# Patient Record
Sex: MALE | Race: WHITE | NOT HISPANIC OR LATINO | Employment: FULL TIME | URBAN - METROPOLITAN AREA
[De-identification: names, ages, dates, MRNs, and addresses within clinical notes are randomized per-mention and may not be internally consistent; named-entity substitution may affect disease eponyms.]

---

## 2017-04-18 ENCOUNTER — GENERIC CONVERSION - ENCOUNTER (OUTPATIENT)
Dept: OTHER | Facility: OTHER | Age: 52
End: 2017-04-18

## 2017-04-19 ENCOUNTER — GENERIC CONVERSION - ENCOUNTER (OUTPATIENT)
Dept: OTHER | Facility: OTHER | Age: 52
End: 2017-04-19

## 2017-07-11 ENCOUNTER — ALLSCRIPTS OFFICE VISIT (OUTPATIENT)
Dept: OTHER | Facility: OTHER | Age: 52
End: 2017-07-11

## 2017-07-11 DIAGNOSIS — E78.00 PURE HYPERCHOLESTEROLEMIA: ICD-10-CM

## 2017-10-19 ENCOUNTER — GENERIC CONVERSION - ENCOUNTER (OUTPATIENT)
Dept: OTHER | Facility: OTHER | Age: 52
End: 2017-10-19

## 2017-10-30 ENCOUNTER — GENERIC CONVERSION - ENCOUNTER (OUTPATIENT)
Dept: OTHER | Facility: OTHER | Age: 52
End: 2017-10-30

## 2018-01-15 NOTE — PROGRESS NOTES
Assessment    1  Never a smoker   2  Encounter for preventive health examination (V70 0) (Z00 00)   3  Hypercholesterolemia (272 0) (E78 00)   · 6 222/52/141   4  Onychomycosis (110 1) (B35 1)   5  Atypical nevi (216 9) (D22 9)    Plan  Encounter for screening colonoscopy    · *1 -  [facility]  *  Status: Active -  Retrospective By Protocol Authorization  Requested for: [address] Summary provided  : Yes  Hypercholesterolemia    · (1) COMPREHENSIVE METABOLIC PANEL; Status:Active; Requested for:[id number];    · (1) LIPID PANEL, FASTING; Status:Active; Requested for:[id number];   Onychomycosis    · Ciclopirox 8 % External Solution; Apply to nail q hs    Discussion/Summary  Impression: health maintenance visit  Currently, he eats a healthy diet  Prostate cancer screening: PSA is not indicated  Colorectal cancer screening: colonoscopy has been ordered  Ed is here for health maintenance visit and discussion of a few chronic issues  He generally has a very healthy diet and exercises one or 2 hours daily because he competes in triathlons  He was given a lab slip for fasting lipid panel and comprehensive metabolic profile  His father  at 46 of an MI, so he has a strong family history of heart disease  He takes no medications currently, but his lipids were elevated a few years ago, and he is not adverse to taking medication if needed  He has fungal toenail on the left side but only the fifth toenail  He was given a prescription for Penlac nail lacquer with instructions  Original blood pressure was quite elevated, but he had received some stressful news just prior to his visit  When I retook it it was top normal range  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  He has several large an atypical appearing nevi on the back  He will continue with yearly dermatitis visits to monitor these  We will plan yearly health maintenance visits        Chief Complaint  annual History of Present Illness  , Adult Male: The patient is being seen for a health maintenance evaluation  The last health maintenance visit was 3 year(s) ago  General Health: The patient's health since the last visit is described as good  He has regular dental visits  He denies vision problems  He has hearing loss  Immunizations status:  notes some buzzing in ears over past year  Lifestyle:  He consumes a diverse and healthy diet  He does not have any weight concerns  He exercises regularly  He does not use tobacco  He consumes alcohol  He denies drug use  Screening:   HPI: Here for  and follow up  Works as  so he gets yearly EKG and vision  Diet is good works out daily  Had testicular pain in 2015, saw urology and it turned out it was related to his bike seat  BP is elevated because he had stressful phone call before it was taken           Review of Systems    Constitutional: No fever or chills, feels well, no tiredness, no recent weight gain or weight loss  ENT: no complaints of earache, no hearing loss, no nosebleeds, no nasal discharge, no sore throat, no hoarseness  Cardiovascular: No complaints of slow heart rate, no fast heart rate, no chest pain, no palpitations, no leg claudication, no lower extremity  Respiratory: No complaints of shortness of breath, no wheezing, no cough, no SOB on exertion, no orthopnea or PND  Gastrointestinal: No complaints of abdominal pain, no constipation, no nausea or vomiting, no diarrhea or bloody stools  Genitourinary: as noted in HPI  Musculoskeletal: No complaints of arthralgia, no myalgias, no joint swelling or stiffness, no limb pain or swelling  Neurological: No compliants of headache, no confusion, no convulsions, no numbness or tingling, no dizziness or fainting, no limb weakness, no difficulty walking  Psychiatric: Is not suicidal, no sleep disturbances, no anxiety or depression, no change in personality, no emotional problems  Endocrine: No complaints of proptosis, no hot flashes, no muscle weakness, no erectile dysfunction, no deepening of the voice, no feelings of weakness  Active Problems    1  Family history of coronary artery disease (V17 3) (Z82 49)   2  Fatigue (780 79) (R53 83)   3  Health care maintenance (V70 0) (Z00 00)   4  Hypercholesterolemia (272 0) (E78 00)   5  Microscopic hematuria (599 72) (R31 29)   6  Need for influenza vaccination (V04 81) (Z23)    Past Medical History    · History of acute sinusitis (V12 69) (Z87 09)   · History of urethritis (V13 09) (A95 349)    Surgical History    · History of Hip Surgery    Family History  Father    · Family history of coronary artery disease (V17 3) (Z80 55)    Social History    · Exercises regularly   · Full-time employment   · Fulltime  [employer]   · Never a smoker   · No tobacco/smoke exposure   · Social alcohol use (Z78 9)    Current Meds   1  No Reported Medications Recorded    Allergies    1  No Known Drug Allergies    Vitals   Recorded: [id number] 12:18PM Recorded: 98LYG2882 87:88MM   Systolic 948 860   Diastolic 86 98   Height  5 ft 9 3 in   Weight  172 lb 2 oz   BMI Calculated  25 2   BSA Calculated  1 94     Physical Exam    Constitutional   General appearance: No acute distress, well appearing and well nourished  Head and Face   Palpation of the face and sinuses: No sinus tenderness  Ears, Nose, Mouth, and Throat   External inspection of ears and nose: Normal     Otoscopic examination: Tympanic membranes translucent with normal light reflex  Canals patent without erythema  Hearing: Normal     Nasal mucosa, septum, and turbinates: Normal without edema or erythema  Lips, teeth, and gums: Normal, good dentition  Oropharynx: Normal with no erythema, edema, exudate or lesions  Neck   Neck: Supple, symmetric, trachea midline, no masses  Thyroid: Normal, no thyromegaly      Pulmonary   Respiratory effort: No increased work of breathing or signs of respiratory distress  Auscultation of lungs: Clear to auscultation  Cardiovascular   Auscultation of heart: Normal rate and rhythm, normal S1 and S2, no murmurs  Carotid pulses: 2+ bilaterally  Pedal pulses: 2+ bilaterally  Examination of extremities for edema and/or varicosities: Normal     Abdomen   Abdomen: Non-tender, no masses  Liver and spleen: No hepatomegaly or splenomegaly  Lymphatic   Palpation of lymph nodes in neck: No lymphadenopathy  Musculoskeletal   Gait and station: Normal     Inspection/palpation of digits and nails: Abnormal   (left foot with thickening and white discoloration 5th nail  Several large nevi on back)   Neurologic   Cranial nerves: Cranial nerves 2-12 intact  Reflexes: 2+ and symmetric  Results/Data  PHQ-2 Adult Depression Screening [id number] 11:26AM User, s     Test Name Result Flag Reference   PHQ-2 Adult Depression Score 0     Over the last two weeks, how often have you been bothered by any of the following problems?   Little interest or pleasure in doing things: Not at all - 0  Feeling down, depressed, or hopeless: Not at all - 0   PHQ-2 Adult Depression Screening Negative         Signatures   Electronically signed by : Sil Hoskins MD; Jul 11 2017 12:46PM EST                       (Author)

## 2018-01-17 NOTE — RESULT NOTES
Verified Results  CT CORONARY CALCIUM SCORE [id number] 10:09AM Montserrat Scott Order Number: [id number]    - Patient Instructions: To schedule this appointment, please contact Central Scheduling at [phone]  Test Name Result Flag Reference   CT CORONARY CALCIUM SCORE (Report)     CT CORONARY ARTERY CALCIUM SCREENING WITHOUT INTRAVENOUS CONTRAST     INDICATION: Strong family history of coronary artery disease  Assess for calcific coronary plaque  TECHNIQUE: Low radiation dose computed tomography of the heart was performed with EKG gating, suspended respiration, and without intravenous contrast  This examination, like all CT scans performed in the [facility], was performed    utilizing techniques to minimize radiation dose exposure, including the use of iterative reconstruction and automated exposure control  Postprocessing was performed on a 3-D computer workstation to measure the amount of calcium in the coronary arteries  Imaging was limited to the heart and the immediately adjacent lungs  FINDINGS:      Coronary artery calcium score breakdown is as follows (note: calcified atherosclerotic plaque in the posterior descending artery is included in right coronary artery score for right dominant circulation and left circumflex score for left dominant    circulation):     Left main coronary artery: 0   Left anterior descending coronary artery and diagonal branches: 36   Left circumflex coronary artery and left marginal branches: 119   Right coronary artery and right marginal branches: 48     TOTAL coronary calcium score: 203   Calcium score PERCENTILE of age, race, and gender matched database participants in the Multi-Ethnic Study of Atherosclerosis (ZAVALA) trial:  95th       No significant abnormality is identified in the heart or visualized surrounding tissues  IMPRESSION:     Total cardiac score equals 209   For more useful information regarding the prognostic significance of the calcium score, please consult the calculator at the website [email or web address] [email or web address][employer]martin [email or web address]  aspx          Workstation performed: [id number]     Signed by:   Juanita Mike MD   11/25/16

## 2018-01-22 VITALS
HEART RATE: 48 BPM | HEIGHT: 70 IN | BODY MASS INDEX: 25.48 KG/M2 | SYSTOLIC BLOOD PRESSURE: 124 MMHG | WEIGHT: 178 LBS | DIASTOLIC BLOOD PRESSURE: 70 MMHG

## 2018-01-22 VITALS
HEIGHT: 69 IN | WEIGHT: 172.13 LBS | DIASTOLIC BLOOD PRESSURE: 86 MMHG | BODY MASS INDEX: 25.5 KG/M2 | SYSTOLIC BLOOD PRESSURE: 138 MMHG

## 2018-01-22 VITALS
SYSTOLIC BLOOD PRESSURE: 118 MMHG | WEIGHT: 178 LBS | HEART RATE: 53 BPM | DIASTOLIC BLOOD PRESSURE: 80 MMHG | HEIGHT: 70 IN | BODY MASS INDEX: 25.48 KG/M2

## 2018-02-09 ENCOUNTER — OFFICE VISIT (OUTPATIENT)
Dept: FAMILY MEDICINE CLINIC | Facility: CLINIC | Age: 53
End: 2018-02-09
Payer: COMMERCIAL

## 2018-02-09 VITALS
HEART RATE: 68 BPM | DIASTOLIC BLOOD PRESSURE: 80 MMHG | HEIGHT: 70 IN | RESPIRATION RATE: 16 BRPM | SYSTOLIC BLOOD PRESSURE: 110 MMHG | BODY MASS INDEX: 25.34 KG/M2 | WEIGHT: 177 LBS | OXYGEN SATURATION: 96 %

## 2018-02-09 DIAGNOSIS — Z12.5 PROSTATE CANCER SCREENING: ICD-10-CM

## 2018-02-09 DIAGNOSIS — E55.9 VITAMIN D DEFICIENCY: ICD-10-CM

## 2018-02-09 DIAGNOSIS — Z00.00 WELL ADULT HEALTH CHECK: Primary | ICD-10-CM

## 2018-02-09 DIAGNOSIS — E78.00 HYPERCHOLESTEROLEMIA: ICD-10-CM

## 2018-02-09 DIAGNOSIS — I25.10 CORONARY ATHEROSCLEROSIS DUE TO CALCIFIED CORONARY LESION: ICD-10-CM

## 2018-02-09 DIAGNOSIS — I25.84 CORONARY ATHEROSCLEROSIS DUE TO CALCIFIED CORONARY LESION: ICD-10-CM

## 2018-02-09 PROBLEM — D22.9 ATYPICAL NEVI: Status: ACTIVE | Noted: 2017-07-11

## 2018-02-09 PROCEDURE — 99396 PREV VISIT EST AGE 40-64: CPT | Performed by: FAMILY MEDICINE

## 2018-02-09 RX ORDER — ASPIRIN 81 MG/1
81 TABLET, CHEWABLE ORAL DAILY
COMMUNITY
Start: 2018-01-24 | End: 2021-01-06

## 2018-02-09 RX ORDER — ATORVASTATIN CALCIUM 10 MG/1
10 TABLET, FILM COATED ORAL
COMMUNITY
Start: 2018-01-24 | End: 2018-06-01 | Stop reason: SDUPTHER

## 2018-02-09 NOTE — ASSESSMENT & PLAN NOTE
I wouldcertainly agree with atorvastatin as well as a baby aspirin at this point   He will be having labs done in the near future to monitor the Lipitor

## 2018-02-09 NOTE — PROGRESS NOTES
FAMILY PRACTICE OFFICE VISIT       NAME: Mireya Myrick  AGE: 48 y o  SEX: male       : 1965        MRN: [id number]    DATE: 2018  TIME: 2:28 PM    Assessment and Plan     Problem List Items Addressed This Visit     Vitamin D deficiency      Recheck vitamin-D with labs ordered by Cardiology  Hypercholesterolemia    Relevant Medications    atorvastatin (LIPITOR) 10 mg tablet    Other Relevant Orders    CBC    Coronary atherosclerosis       I wouldcertainly agree with atorvastatin as well as a baby aspirin at this point  He will be having labs done in the near future to monitor the Lipitor         Relevant Orders    Comprehensive metabolic panel    Lipid Panel with Direct LDL reflex    CBC      Other Visit Diagnoses     Well adult health check    -  Primary    Prostate cancer screening        Relevant Orders    PSA          Coronary atherosclerosis    I wouldcertainly agree with atorvastatin as well as a baby aspirin at this point  He will be having labs done in the near future to monitor the Lipitor    Vitamin D deficiency   Recheck vitamin-D with labs ordered by Cardiology  There are no Patient Instructions on file for this visit  Chief Complaint     Chief Complaint   Patient presents with    Follow-up     6Month       History of Present Illness   Mireya Myrick is a 48y o -year-old male who   Presents today for an annual physical   Overall, he is in excellent health  He exercises routinely with triathlons and has no cardiovascular limitations  He does have a high coronary calcium score and a significant family history of cardiac disease as his father  suddenly at the age of 46  He has been experiencing no chest pain, shortness of breath, palpitations or lightheadedness  He has elevated cholesterol and recently agreed to start atorvastatin as prescribed by his cardiologist   He is yet to start the aspirin      Review of Systems   Review of Systems   Constitutional: Negative for appetite change, chills, fatigue, fever and unexpected weight change  HENT: Negative for ear pain and trouble swallowing  Eyes: Negative for visual disturbance  Respiratory: Negative for chest tightness, shortness of breath and wheezing  Cardiovascular: Negative for chest pain  Gastrointestinal: Negative for abdominal distention, abdominal pain, blood in stool, constipation and diarrhea  Endocrine: Negative for polyuria  Genitourinary: Negative for difficulty urinating and flank pain  Musculoskeletal: Negative for arthralgias and myalgias  Skin: Negative for rash  Neurological: Negative for dizziness and light-headedness  Hematological: Negative for adenopathy  Does not bruise/bleed easily  Psychiatric/Behavioral: Negative for sleep disturbance  Active Problem List     Patient Active Problem List   Diagnosis    Vitamin D deficiency    Hypercholesterolemia    Coronary atherosclerosis    Atypical nevi    Microscopic hematuria         Past Medical History:  No past medical history on file  Past Surgical History:  Past Surgical History:   Procedure Laterality Date    HIP SURGERY      Trochanteric fracture requiring nail       Family History:  Family History   Problem Relation Age of Onset    Cancer Mother     Hypertension Mother     Coronary artery disease Father     Hyperlipidemia Father     Hypertension Brother     Colon cancer Neg Hx        Social History:  Social History     Social History    Marital status: /Civil Union     Spouse name: N/A    Number of children: N/A    Years of education: N/A     Occupational History    Not on file       Social History Main Topics    Smoking status: Never Smoker    Smokeless tobacco: Never Used    Alcohol use Yes    Drug use: Unknown    Sexual activity: Not on file     Other Topics Concern    Not on file     Social History Narrative    No narrative on file       Objective     Vitals:    02/09/18 1337 BP: 110/80   Pulse: 68   Resp: 16   SpO2: 96%     Wt Readings from Last 3 Encounters:   02/09/18 80 3 kg (177 lb)   10/19/17 80 7 kg (178 lb)   07/11/17 78 1 kg (172 lb 2 1 oz)       Physical Exam   Constitutional: He is oriented to person, place, and time  He appears well-developed and well-nourished  No distress  HENT:   Head: Normocephalic  Eyes: Pupils are equal, round, and reactive to light  Neck: No tracheal deviation present  No thyromegaly present  Cardiovascular: Normal rate, regular rhythm and normal heart sounds  No murmur heard  Pulmonary/Chest: Effort normal  No respiratory distress  He has no wheezes  He has no rales  Abdominal: Soft  He exhibits no distension  There is no tenderness  Musculoskeletal: Normal range of motion  Neurological: He is alert and oriented to person, place, and time  No cranial nerve deficit  Skin: Skin is warm  He is not diaphoretic  Psychiatric: He has a normal mood and affect  Judgment and thought content normal        Pertinent Laboratory/Diagnostic Studies:  No results found for: GLUCOSE, BUN, CREATININE, CALCIUM, NA, K, CO2, CL  No results found for: ALT, AST, GGT, ALKPHOS, BILITOT    No results found for: WBC, HGB, HCT, MCV, PLT    No results found for: TSH    No results found for: CHOL  No results found for: TRIG  No results found for: HDL  No results found for: LDLCALC  No results found for: HGBA1C    No results found for this or any previous visit  Orders Placed This Encounter   Procedures    PSA    Comprehensive metabolic panel    Lipid Panel with Direct LDL reflex    CBC       ALLERGIES:  No Known Allergies    Current Medications     Current Outpatient Prescriptions   Medication Sig Dispense Refill    aspirin 81 mg chewable tablet Chew 81 mg      atorvastatin (LIPITOR) 10 mg tablet Take 10 mg by mouth       No current facility-administered medications for this visit            Health Maintenance   There are no preventive care reminders to display for this patient    Immunization History   Administered Date(s) Administered    Anthrax 08/08/1999, 10/02/1999, 10/03/1999, 03/04/2000, 12/14/2002, 01/27/2004    Hep B, adult 01/27/2004, 11/02/2005    Influenza 11/05/2012, 10/01/2017    Influenza Quadrivalent Preservative Free 3 years and older IM 08/31/2015    Influenza Quadrivalent Preservative Free Pediatric IM 10/01/2016    Influenza Quadrivalent, 6-35 Months IM 11/15/2015    Influenza TIV (IM) 11/05/2012    MMR 12/01/2001    Meningococcal, Unknown Serogroups 01/27/2004    Td (adult), adsorbed 09/10/1990, 08/01/1991, 05/05/2002    Tdap 05/29/2014    Tuberculin Skin Test-PPD Intradermal 04/02/1986, 10/03/1989, 08/23/1990, 01/11/1993, 07/06/1998, 12/04/1999, 01/10/2003, 01/27/2004    Typhoid, ViCPs 04/28/1988, 09/10/1990, 11/08/1993, 06/30/1998, 09/01/2002, 11/02/2005    Yellow Fever 07/10/1998       Joey Angulo MD

## 2018-03-09 ENCOUNTER — OFFICE VISIT (OUTPATIENT)
Dept: URGENT CARE | Facility: MEDICAL CENTER | Age: 53
End: 2018-03-09
Payer: COMMERCIAL

## 2018-03-09 VITALS
BODY MASS INDEX: 24.34 KG/M2 | HEIGHT: 70 IN | RESPIRATION RATE: 18 BRPM | DIASTOLIC BLOOD PRESSURE: 86 MMHG | OXYGEN SATURATION: 97 % | WEIGHT: 170 LBS | SYSTOLIC BLOOD PRESSURE: 134 MMHG | TEMPERATURE: 98.9 F | HEART RATE: 58 BPM

## 2018-03-09 DIAGNOSIS — H69.81 ETD (EUSTACHIAN TUBE DYSFUNCTION), RIGHT: ICD-10-CM

## 2018-03-09 DIAGNOSIS — H60.311 ACUTE DIFFUSE OTITIS EXTERNA OF RIGHT EAR: Primary | ICD-10-CM

## 2018-03-09 PROCEDURE — G0382 LEV 3 HOSP TYPE B ED VISIT: HCPCS | Performed by: FAMILY MEDICINE

## 2018-03-09 RX ORDER — NEOMYCIN SULFATE, POLYMYXIN B SULFATE AND DEXAMETHASONE 3.5; 10000; 1 MG/ML; [USP'U]/ML; MG/ML
SUSPENSION/ DROPS OPHTHALMIC
Qty: 5 ML | Refills: 0 | Status: SHIPPED | OUTPATIENT
Start: 2018-03-09 | End: 2018-08-07

## 2018-03-09 NOTE — PATIENT INSTRUCTIONS
There is some suspicion for mild otitis media of the right side and I will treat patient with a course of antibiotic ear drops  Some of the symptoms are consistent with eustachian tube dysfunction  May continue Valsalva maneuver and decongestants when u do not anticipate flying  Follow up with ENT in 1 week if no significant improvement in symptoms

## 2018-03-11 NOTE — PROGRESS NOTES
[facility]        NAME: Osiel Morocho is a 48 y o  male  : 1965    MRN: [id number]  DATE: 2018  TIME: 12:56 PM    Assessment and Plan   Acute diffuse otitis externa of right ear [H60 311]  1  Acute diffuse otitis externa of right ear  neomycin-polymyxin-dexamethasone (MAXITROL) ophthalmic suspension   2  ETD (Eustachian tube dysfunction), right  neomycin-polymyxin-dexamethasone (MAXITROL) ophthalmic suspension         Patient Instructions       Follow up with PCP in 3-5 days  Proceed to  ER if symptoms worsen  Chief Complaint     Chief Complaint   Patient presents with    Earache     Patient c/o R side ear pain x 1 week          History of Present Illness       Earache    There is pain in the right ear  This is a new problem  The current episode started in the past 7 days  The problem has been unchanged  There has been no fever  The pain is mild  Pertinent negatives include no abdominal pain, coughing, diarrhea, ear discharge, headaches, hearing loss, neck pain, rash, rhinorrhea, sore throat or vomiting  He has tried nothing for the symptoms  There is no history of a chronic ear infection or hearing loss  Review of Systems   Review of Systems   HENT: Positive for ear pain  Negative for ear discharge, hearing loss, rhinorrhea and sore throat  Respiratory: Negative for cough  Gastrointestinal: Negative for abdominal pain, diarrhea and vomiting  Musculoskeletal: Negative for neck pain  Skin: Negative for rash  Neurological: Negative for headaches           Current Medications       Current Outpatient Prescriptions:     aspirin 81 mg chewable tablet, Chew 81 mg, Disp: , Rfl:     atorvastatin (LIPITOR) 10 mg tablet, Take 10 mg by mouth, Disp: , Rfl:     neomycin-polymyxin-dexamethasone (MAXITROL) ophthalmic suspension, 3 drops in R ear three times a day for 7 days, Disp: 5 mL, Rfl: 0    Current Allergies     Allergies as of 2018    (No Known Allergies) The following portions of the patient's history were reviewed and updated as appropriate: allergies, current medications, past family history, past medical history, past social history, past surgical history and problem list      No past medical history on file  Past Surgical History:   Procedure Laterality Date    HIP SURGERY      Trochanteric fracture requiring nail       Family History   Problem Relation Age of Onset    Cancer Mother     Hypertension Mother     Coronary artery disease Father     Hyperlipidemia Father     Hypertension Brother     Colon cancer Neg Hx          Medications have been verified  Objective   /86   Pulse 58   Temp 98 9 °F (37 2 °C)   Resp 18   Ht 5' 10" (1 778 m)   Wt 77 1 kg (170 lb)   SpO2 97%   BMI 24 39 kg/m²        Physical Exam     Physical Exam   Constitutional: He appears well-developed and well-nourished  No distress  HENT:   Head: Normocephalic  Right Ear: External ear and ear canal normal  Tympanic membrane is not erythematous and not bulging  Left Ear: External ear and ear canal normal  Tympanic membrane is not erythematous and not bulging  Nose: Mucosal edema and rhinorrhea present  Mouth/Throat: Oropharynx is clear and moist and mucous membranes are normal  No uvula swelling  No oropharyngeal exudate, posterior oropharyngeal edema, posterior oropharyngeal erythema or tonsillar abscesses  Eyes: Conjunctivae and EOM are normal  Right eye exhibits no discharge  Left eye exhibits no discharge  Neck: Neck supple  Cardiovascular: Normal rate, regular rhythm and normal heart sounds  Pulmonary/Chest: Effort normal and breath sounds normal  No respiratory distress  He has no wheezes  He has no rales  Skin: He is not diaphoretic  Nursing note and vitals reviewed

## 2018-04-18 ENCOUNTER — OFFICE VISIT (OUTPATIENT)
Dept: FAMILY MEDICINE CLINIC | Facility: CLINIC | Age: 53
End: 2018-04-18

## 2018-04-18 VITALS
HEIGHT: 70 IN | DIASTOLIC BLOOD PRESSURE: 84 MMHG | SYSTOLIC BLOOD PRESSURE: 120 MMHG | RESPIRATION RATE: 12 BRPM | HEART RATE: 64 BPM | BODY MASS INDEX: 25.2 KG/M2 | WEIGHT: 176 LBS

## 2018-04-18 DIAGNOSIS — Z02.89 ENCOUNTER FOR FEDERAL AVIATION ADMINISTRATION (FAA) EXAMINATION: Primary | ICD-10-CM

## 2018-04-18 PROCEDURE — 99499 UNLISTED E&M SERVICE: CPT | Performed by: FAMILY MEDICINE

## 2018-04-18 NOTE — PROGRESS NOTES
Chief Complaint   Patient presents with    Physical Exam     [facility] PE with EKG with Readers Conf Number: [id number]

## 2018-05-31 RX ORDER — LISINOPRIL 10 MG/1
10 TABLET ORAL
COMMUNITY
Start: 2018-05-26 | End: 2018-07-10 | Stop reason: SDUPTHER

## 2018-06-01 ENCOUNTER — OFFICE VISIT (OUTPATIENT)
Dept: CARDIOLOGY CLINIC | Facility: CLINIC | Age: 53
End: 2018-06-01
Payer: COMMERCIAL

## 2018-06-01 VITALS
HEIGHT: 70 IN | SYSTOLIC BLOOD PRESSURE: 88 MMHG | HEART RATE: 57 BPM | DIASTOLIC BLOOD PRESSURE: 60 MMHG | BODY MASS INDEX: 23.48 KG/M2 | WEIGHT: 164 LBS

## 2018-06-01 DIAGNOSIS — I10 BENIGN ESSENTIAL HTN: ICD-10-CM

## 2018-06-01 DIAGNOSIS — E78.5 DYSLIPIDEMIA: ICD-10-CM

## 2018-06-01 DIAGNOSIS — I25.10 CORONARY ARTERY DISEASE INVOLVING NATIVE HEART, ANGINA PRESENCE UNSPECIFIED, UNSPECIFIED VESSEL OR LESION TYPE: Primary | ICD-10-CM

## 2018-06-01 DIAGNOSIS — I25.2 MYOCARDIAL INFARCT, OLD: ICD-10-CM

## 2018-06-01 DIAGNOSIS — I25.10 CORONARY ARTERY DISEASE INVOLVING NATIVE CORONARY ARTERY OF NATIVE HEART WITHOUT ANGINA PECTORIS: ICD-10-CM

## 2018-06-01 PROCEDURE — 93000 ELECTROCARDIOGRAM COMPLETE: CPT | Performed by: INTERNAL MEDICINE

## 2018-06-01 PROCEDURE — 99204 OFFICE O/P NEW MOD 45 MIN: CPT | Performed by: INTERNAL MEDICINE

## 2018-06-01 RX ORDER — ATORVASTATIN CALCIUM 80 MG/1
80 TABLET, FILM COATED ORAL DAILY
Qty: 90 TABLET | Refills: 5
Start: 2018-06-01 | End: 2018-08-22 | Stop reason: SDUPTHER

## 2018-06-01 RX ORDER — METOPROLOL SUCCINATE 25 MG/1
25 TABLET, EXTENDED RELEASE ORAL DAILY
Qty: 30 TABLET | Refills: 5 | Status: SHIPPED | OUTPATIENT
Start: 2018-06-01 | End: 2018-08-22 | Stop reason: SDUPTHER

## 2018-06-01 NOTE — PATIENT INSTRUCTIONS
F/U in 6 weeks after repeat lipid panel  Echo with next visit  Call if SBP < 100 or if persistent lightheadedness--will lower lisinopril if occurs  Cardiac rehab

## 2018-06-01 NOTE — PROGRESS NOTES
Cardiology Consultation        Lis Doherty      1965      [id number]    Reasonfor consultation:  CAD, prior cardiac arrest        Discussion/Summary:    1  Anterior wall ST-elevation myocardial infarction 4/76/1559, complicated by VF arrest, status post defibrillation x3, therapeutic hypothermia  2  CAD with residual 80% left circumflex disease  3  Dyslipidemia, high suspicion of heterozygous familial hypercholesterolemia (LDL in 2015 up to 182 mg/dL in the setting of regular exercise and extremely healthy lifestyle, father with myocardial infarction at age 46)    · From a symptomatic standpoint this patient is doing very well  Will continue dual anti-platelet treatment for at least 1 year, then reassess risks/benefit profile  Continue lisinopril  Will change metoprolol to 25 mg once daily, succinate formulation as patient does have lightheadedness and low blood pressure on today's visit  Encouraged adequate hydration  · Echocardiogram with next visit in 6 weeks  · Repeat lipid panel before next visit in 6 weeks  Goal LDL should be well below 70, non-HDL well below 100  If goal not met, may consider changing to rosuvastatin 40 mg daily and adding ezetimibe  · Recommended screening lipid panel of patient's children   · Will refer to cardiac rehabilitation  · PCI of left circumflex scheduled for next week at [place]  · 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  · ECG with nonspecific ST and T-wave abnormality anteriorly, likely secondary to prior infarction  · Patient to call if any changes        Interval History: This is a very pleasant 29-year-old male with a history of dyslipidemia and family history of premature CAD with father having myocardial infarction at age 46  He is a very health conscious   On 05/20/2018 several hours into a triathlon, while running, he experienced a cardiac arrest which was witnessed  Fortunately, 80 was applied and he received 3 shocks and subsequent CPR  He presented to [facility] at which time ECG revealed anterior ST elevations  Emergent cardiac catheterization revealed 100% occlusion of proximal LAD and he thereafter underwent PCI/KASSANDRA with 3 5 x 23 mm stent to the proximal LAD  He had 10% mid RCA disease and 80% mid circumflex disease  He underwent therapeutic hypothermia with subsequent mild cognitive decline and memory loss  Echocardiogram 05/23/2018 revealed slight improvement in left ventricular ejection fraction at 50%  He was evaluated by Leavy Nissen, CRNP at [place] yesterday  From a symptomatic standpoint, he has substernal chest pain with sneezing or coughing, or palpation which has progressively been improving  His cognitive function is almost completely back to normal, although it he does have residual memory loss around the time of his cardiac arrest   He denies exertional chest discomfort, dyspnea, palpitations, lower extremity edema, any further episodes of presyncope or syncope, arthralgias or myalgias, abnormal bleeding or bruising while on dual anti-platelet treatment  He has been maintained on aspirin, Brilinta, high-dose atorvastatin, lisinopril 10 mg daily, metoprolol tartrate 25 mg twice daily  He does admit to occasional lightheadedness with standing up  His wife was a dietitian feels that his blood pressure is too low  He has not yet initiated cardiac rehabilitation  Review of prior blood work 12/01/2015 reveals very high LDL at 182 mg/dL, elevated triglycerides at 167 mg/dL, HDL of 48  Most recent lipid panel from 05/21/2018 revealed LDL 84 mg/dL, triglycerides 69 mg/dL      He is scheduled for PCI of left circumflex artery next week at [facility]:  Vitals:    06/01/18 1027   BP: (!) 88/60   BP Location: Right arm   Patient Position: Sitting   Cuff Size: Standard   Pulse: 57   Weight: 74 4 kg (164 lb)   Height: 5' 10" (1 778 m)         No past medical history on file  Social History     Social History    Marital status: /Civil Union     Spouse name: N/A    Number of children: N/A    Years of education: N/A     Occupational History    Not on file  Social History Main Topics    Smoking status: Never Smoker    Smokeless tobacco: Never Used    Alcohol use Yes    Drug use: Unknown    Sexual activity: Not on file     Other Topics Concern    Not on file     Social History Narrative    No narrative on file      Family History   Problem Relation Age of Onset    Cancer Mother     Hypertension Mother     Coronary artery disease Father     Hyperlipidemia Father     Hypertension Brother     Colon cancer Neg Hx      Past Surgical History:   Procedure Laterality Date    HIP SURGERY      Trochanteric fracture requiring nail       Current Outpatient Prescriptions:     aspirin 81 mg chewable tablet, Chew 81 mg, Disp: , Rfl:     atorvastatin (LIPITOR) 80 mg tablet, Take 1 tablet (80 mg total) by mouth daily, Disp: 90 tablet, Rfl: 5    lisinopril (ZESTRIL) 10 mg tablet, Take 10 mg by mouth, Disp: , Rfl:     ticagrelor (BRILINTA) 90 MG, Take 90 mg by mouth every 12 (twelve) hours, Disp: , Rfl:     metoprolol succinate (TOPROL-XL) 25 mg 24 hr tablet, Take 1 tablet (25 mg total) by mouth daily, Disp: 30 tablet, Rfl: 5    neomycin-polymyxin-dexamethasone (MAXITROL) ophthalmic suspension, 3 drops in R ear three times a day for 7 days, Disp: 5 mL, Rfl: 0        Review of Systems:  Review of Systems   Respiratory: Negative  Neurological: Positive for light-headedness  All other systems reviewed and are negative  Physical Exam:  Physical Exam   Constitutional: He is oriented to person, place, and time  He appears well-developed and well-nourished  No distress  HENT:   Head: Normocephalic and atraumatic  Eyes: EOM are normal  Pupils are equal, round, and reactive to light  Right eye exhibits no discharge  No scleral icterus  Neck: Normal range of motion  Neck supple  No thyromegaly present  Cardiovascular: Normal rate, regular rhythm and normal heart sounds  Exam reveals no gallop and no friction rub  No murmur heard  Pulmonary/Chest: [place]ort normal and breath sounds normal    Abdominal: He exhibits no distension  There is no tenderness  There is no rebound and no guarding  Musculoskeletal: Normal range of motion  He exhibits no edema  Neurological: He is alert and oriented to person, place, and time  Coordination normal    Skin: Skin is warm and dry  No rash noted  He is not diaphoretic  No erythema  No pallor  Psychiatric: He has a normal mood and affect   His behavior is normal  Judgment and thought content normal

## 2018-06-05 ENCOUNTER — CLINICAL SUPPORT (OUTPATIENT)
Dept: CARDIAC REHAB | Facility: HOSPITAL | Age: 53
End: 2018-06-05
Attending: INTERNAL MEDICINE
Payer: COMMERCIAL

## 2018-06-05 VITALS — HEIGHT: 70 IN | BODY MASS INDEX: 23.48 KG/M2 | WEIGHT: 164 LBS

## 2018-06-05 DIAGNOSIS — I25.2 MYOCARDIAL INFARCT, OLD: ICD-10-CM

## 2018-06-05 DIAGNOSIS — I25.10 CORONARY ARTERY DISEASE INVOLVING NATIVE HEART, ANGINA PRESENCE UNSPECIFIED, UNSPECIFIED VESSEL OR LESION TYPE: ICD-10-CM

## 2018-06-05 PROCEDURE — 93797 PHYS/QHP OP CAR RHAB WO ECG: CPT

## 2018-06-05 NOTE — PROGRESS NOTES
Exercise Prescription       Exercise Modality  Initial Workload MET Level    Nu-Step (NU) Level: n/a     Rower (RW)  Yeboah: 45-60 4 0 METs   Life Cycle Level: 4 5 0 METs   Arm Ergometer (AE) RPM: 25 Level 3 5 4 0 METs   Treadmill 3 4 mph 1 % grade 4 1 METs   Recumbent Bike (RB) Level: 4 Yeboah: 40 5 METs   Resistance (RES) 5-10 lbs Weights 40-60 lbs Pulleys Level Red Resistance Bands        Comments: Patient completed submax GXT reaching 7 5 METs  Gordon score suggests starting 3 3 METs  We will start exercise at 4 0-5 0 METs and increase intensities as tolerated by patient to reach goal of running and biking  -143

## 2018-06-05 NOTE — PROGRESS NOTES
CARDIAC/PULMONARY REHAB ASSESSMENT    Today's date: 2018   Patient name: Tonya Frausto      : 1965       MRN: [id number]  PCP: Angeles Whitaker MD  Cardiologist: Dr Partha Lynne    Dx:   Encounter Diagnoses   Name Primary?  Coronary artery disease involving native heart, angina presence unspecified, unspecified vessel or lesion type     Myocardial infarct, old      Date of onset: 2018  Cultural needs: n/a    Height: 5'10"  Weight: 164 lb   Medical History: No past medical history on file  Physical Limitations: None  Other: patient to have stent placed on 18 at [facility]-Dr Fidel Potts    Risk Factors   Cholesterol: yes  Smoking: no-never smoker  HTN: no  DM: no  Obesity: no  Inactivity: no-was running triath[employer] when event happened  Family History:  Premature CAD-Father MI 46 yrs  Family History   Problem Relation Age of Onset    Cancer Mother     Hypertension Mother     Coronary artery disease Father     Hyperlipidemia Father     Hypertension Brother     Colon cancer Neg Hx      Allergies: No Known Allergies  Other: Hip surgery-    Current Medications:   Current Outpatient Prescriptions   Medication Sig Dispense Refill    aspirin 81 mg chewable tablet Chew 81 mg      atorvastatin (LIPITOR) 80 mg tablet Take 1 tablet (80 mg total) by mouth daily 90 tablet 5    lisinopril (ZESTRIL) 10 mg tablet Take 10 mg by mouth      metoprolol succinate (TOPROL-XL) 25 mg 24 hr tablet Take 1 tablet (25 mg total) by mouth daily 30 tablet 5    neomycin-polymyxin-dexamethasone (MAXITROL) ophthalmic suspension 3 drops in R ear three times a day for 7 days 5 mL 0    ticagrelor (BRILINTA) 90 MG Take 90 mg by mouth every 12 (twelve) hours       No current facility-administered medications for this visit          Functional Status Prior to Diagnosis for Treatment   Occupation: [employer] , realtor  Recreation: training, running, swimming, biking 1-2 hrs / day- for [employer]: yard work, mowing, pool management, house work  Riley: independent  Exercise: Running 5-12 miles 2-3x/week, swimming 1-2 days a wee (2miles), biking 20 miles 2x/week  Current Functional Status  Occupation: using [employer] license- 0-1/3 year to return to [address], realtor also  Recreation: has not returned to [employer]  ADLs: house work, yard work  Riley: independent  Exercise: Not currently exercising due to not knowing what limitations he has      Short Term Program Goals:   Be able to return to running, biking, and swimming complete triathalons, not have limitations  Running 5-12 miles 2-3x/week, Biking 20 miles 1-2x/week, swimming 2 miles 1-2x/week      Long Term Goals:   Compete in a triathalon within 6 months  Return to work as a  in a year    Ability to reach goals/rehabilitation potential: Patient has set reasonable and attainable goals    Projected return to function: Patient is scheduled to attend CII 3x/week for 12-15 weeks  Functional capacity will increase goal of 40% within that time frame  Objective tests: Submaximal GXT will be done pre/post rehab        Nutritional   Fats/Oils: Olive oil  Sodium: no restrictions  Sweets/ETOH/Caffeine: Occ sweets, social ETOH, caffeine: 1-2 day   Dairy/Eggs: low fat dairy, eggs limited  Meats:    Beef: 1-2x/month   Fish: 1-2x/week  Chicken/Turkey: 1-2x/week  Pork/Ham: 1-2x/mo  Processed Meats: none  Fruits: 3-4x/day  Vegetables: 3-4x/day  Grains/Beans: whole grain cereal, bread, brown rice  Supplements: none  Social: Cook at home    Clinical Implications: n/a    Goals: Continue following low fat/chol diet   Increase fiber intake    Emotional/Social  Marital status:   Full-time   Rate 1-5:    Marriage: 5 Values/Choices 5   Family: 5 Values/Choices 5   Financial: 5 Values/Choices 5   Relationships: 5 Values/Choices 5   Spirituality: 5 Values/Choices 5   Intellectual: 5 Values/Choices 5   Support from wife, 3 children, friends    Life Stressors: Recent death of friend who has been supporting/helping him since heart attack-sudden accidental  Reports not much stress- manages positively    Goals: Use stress management and relaxation techniques to continue positively managing stress    Domestic Violence Screening: No

## 2018-06-05 NOTE — PROGRESS NOTES
Cardiac/Pulmonary Rehabilitation Plan of Care   Care Plan       Today's date: 2018   Visits: 1  Patient name: Monique Gibbons      : 1965  Age: 48 y o  MRN: [id number]  Referring Physician: Lucie Craft DO  Provider: [facility]  Clinician: Sarah Milton MS    Dx:   Encounter Diagnoses   Name Primary?  Coronary artery disease involving native heart, angina presence unspecified, unspecified vessel or lesion type     Myocardial infarct, old      Date of onset: 2018    Comments: Mr Jyoti Benjamin is excited to start his CII program 3x/week  He is looking forward to returning to running, biking, and swimming  He hopes to be able to return to functional capacity with activities prior to his MI  He would like to have no limitations and be able to participate in triathalons again  Medication compliance: Yes   Comments: taking medications as prescribed  Fall Risk: No   Comments: no falls to date    EXERCISE/ACTIVITY    Cardiovascular:   Min: 20-30 min   METS: 4 0-5 0   Hr: 101-141   RPE: 11-13   O2 sat: n/a    Modalities: Treadmill, UBE, Lifecycle, Eliptical , Rower and Recumbent bike  Strength trainin-3 days / week, 12-15 repitations  and 1-2 sets per modality    Modalities: Chest press, Lateral pull down , Lateral raise and Arm curl, arm extension- to incorporate at 3-4 weeks r/t soreness in chest from CPR  EKG changes: none  Dyspnea score: n/a  Home activity: Limited activity at this time due to no knowing restrictions and limitations of exercise  Goals: Start exercising 3x/week at CII for 30 min and incorporate home exercise (running, biking, swimming) 2-3x /week as tolerated within first 30 days of CII  Education: THR/RPE, S/S overexertion, exercise principles, home exercise  Plan: Start CII 3x/week attending regularly and start home exercise 2-3x/week as tolerated  Educate patient on self monitoring and safely exercising    Readiness to change: Action    NUTRITION    Weight control:    Starting weight: 164 lbs   Current weight:     Waist circumference:    Startin   Current:    Diabetes: N/A  Lipid management: patient has history of high cholesterol and family history r/t cholesterol  Labs prior to event LDL: 182, Tri, HDL: 48  2018 LDL: 84, Tri  To have lipids drawn again in 6 weeks  Goals: Manage lipids within normal guidelines with medication, diet, and exercise  Education: lipid management, Diet, label reading, DASH  Plan: Return to regular exercise and follow low fat/chol diet to help manage lipids  Patient education on lipid management  Readiness to change: action    PSYCHOSOCIAL    Emotional: PHQ-9=8- reports sometimes feeling down or anxiety  High score due to not sleeping well and tiredness  Self-reported stress level: 1 - minimal stress right now  Social support: Reports good support from his wife who is a dietician-helping to follow healthy diet  Friends have been supportive since MI    Goals: Continue using stress management and relaxation techniques to positively manage stress  Education: Stress management, relaxation techniques  Plan: Resume regular exercise to use as relaxation technique to manage stress as needed  Readiness to change: action    OTHER CORE COMPONENTS     Tobacco:   History   Smoking Status    Never Smoker   Smokeless Tobacco    Never Used     Blood pressure:    Restin/70   Exercise:  132/72  Goals: n/a  Education: n/a  Plan: n/a  Readiness to change: Maintenance

## 2018-06-05 NOTE — PROGRESS NOTES
Outcomes    Name: Christofer Montes De Oca : 1965 Dx: MI, PCI     Risk:      LOW/ MOD/ HIGH        Pre Post % change  Goal   Date: 2018      Physical           Sub Max ETT (mets) 7 5  -100 0% 10% increase   6MWT (feet) n/a  #VALUE! 10% increase   UCSD Dyspnea Score n/a  #VALUE! 5 pt decrease   Supplemental O2 use (L) n/a      [facility] AI (est  peak O2) 3 3  -100 0%    Peak exercise CR/ WV (mets) 5  -100 0% 40% increase   Questionnaires           PHQ 9  (> 10 refer to MD) 8  -100 0% 4 pt decrease   [facility] lower score = improvement     Total  19  -100 0% < 27   Feelings 2  -100 0% < 3   Physical fitness 4  -100 0% < 3   Social Support 1  -100 0% < 3   Daily activities 1  -100 0% < 3   Social Activities 1  -100 0% < 3   Pain 2  -100 0% < 3   Overall Health 3  -100 0% < 3   Quality of Life 2  -100 0% < 3   Change in health 33  -100 0% < 3   Rate your plate 72  -664 5% > 58   Measurements           Weight 164  -100 0% 2 5-5%    BMI 23 5  -100 0% 19-25   Waist Circ  32  -100 0% < 40 M / < 35 F   % Body fat 22 4  -100 0% < 25 M / < 33 F    BP left arm     (systolic) 645  -764 4% < 123                              (diastolic) 70  -526 6% < 90   Smoking #/day (if applicable) 0  #DIV/0! 0   Total cholesterol   #DIV/0!    Triglycerides 69  -100 0% < 150   HDL 48  -100 0% 40-60   LDL 84  -100 0% < 100   A1C % not available  #VALUE! 4 0 - 5 6%   Fasting BG not available  #VALUE!

## 2018-06-08 ENCOUNTER — TRANSITIONAL CARE MANAGEMENT (OUTPATIENT)
Dept: FAMILY MEDICINE CLINIC | Facility: CLINIC | Age: 53
End: 2018-06-08

## 2018-06-11 ENCOUNTER — CLINICAL SUPPORT (OUTPATIENT)
Dept: CARDIAC REHAB | Facility: HOSPITAL | Age: 53
End: 2018-06-11
Payer: COMMERCIAL

## 2018-06-11 DIAGNOSIS — I21.3 ST ELEVATION MYOCARDIAL INFARCTION (STEMI), UNSPECIFIED ARTERY (HCC): ICD-10-CM

## 2018-06-11 DIAGNOSIS — Z95.5 S/P CORONARY ARTERY STENT PLACEMENT: ICD-10-CM

## 2018-06-11 PROCEDURE — 93798 PHYS/QHP OP CAR RHAB W/ECG: CPT

## 2018-06-11 NOTE — PROGRESS NOTES
Exercise Session Telemetry Strips    Education done with pt regarding equipment safety,THR, RPE scale with a target range of 11-14, s/s of over exertion, home exercise plan on non-rehab days  Pt verbalized understanding and agreement

## 2018-06-13 ENCOUNTER — CLINICAL SUPPORT (OUTPATIENT)
Dept: CARDIAC REHAB | Facility: HOSPITAL | Age: 53
End: 2018-06-13
Payer: COMMERCIAL

## 2018-06-13 DIAGNOSIS — Z95.5 S/P CORONARY ARTERY STENT PLACEMENT: ICD-10-CM

## 2018-06-13 DIAGNOSIS — I21.3 ST ELEVATION MYOCARDIAL INFARCTION (STEMI), UNSPECIFIED ARTERY (HCC): ICD-10-CM

## 2018-06-13 PROCEDURE — 93798 PHYS/QHP OP CAR RHAB W/ECG: CPT

## 2018-06-13 NOTE — PROGRESS NOTES
Exercise Session Telemetry Strips  Exercise Prescription               Exercise Modality  Initial Workload MET Level    Nu-Step (NU) Level: n/a       Rower (RW)   Yeboah: 45-60 4 0 METs   Life Cycle Level: 4 5 0 METs   Arm Ergometer (AE) RPM: 25 Level 3 5 4 0 METs   Treadmill 3 4 mph 1 % grade 4 1 METs   Recumbent Bike (RB) Level: 4 Yeboah: 40 5 METs   Resistance (RES) 5-10 lbs Weights 40-60 lbs Pulleys Level Red Resistance Bands

## 2018-06-14 ENCOUNTER — CLINICAL SUPPORT (OUTPATIENT)
Dept: CARDIAC REHAB | Facility: HOSPITAL | Age: 53
End: 2018-06-14
Attending: INTERNAL MEDICINE
Payer: COMMERCIAL

## 2018-06-14 DIAGNOSIS — Z95.5 S/P CORONARY ARTERY STENT PLACEMENT: ICD-10-CM

## 2018-06-14 PROCEDURE — 93798 PHYS/QHP OP CAR RHAB W/ECG: CPT

## 2018-06-15 ENCOUNTER — TELEPHONE (OUTPATIENT)
Dept: CARDIOLOGY CLINIC | Facility: CLINIC | Age: 53
End: 2018-06-15

## 2018-06-15 ENCOUNTER — APPOINTMENT (OUTPATIENT)
Dept: CARDIAC REHAB | Facility: HOSPITAL | Age: 53
End: 2018-06-15
Payer: COMMERCIAL

## 2018-06-15 NOTE — TELEPHONE ENCOUNTER
Wife called patient has been having diarrhea past few days questioning if could be medications    Gaetano Young all meds are basically new for him in the recent weeks    please advise  States not severe and wife did say son also was complaining of stomach not feeling right  Told him would ask your recommendations  Thanks

## 2018-06-18 ENCOUNTER — APPOINTMENT (OUTPATIENT)
Dept: CARDIAC REHAB | Facility: HOSPITAL | Age: 53
End: 2018-06-18
Payer: COMMERCIAL

## 2018-06-18 NOTE — TELEPHONE ENCOUNTER
Lisinopril and metoprolol can potentially cause diarrhea  IF diarrhea new (and he's been on meds since early June), may not be medications  Would just encourage hydration for now, also d/w PCP Dr Trudy Swenson, and if persistent diarrhea with no other cause over next 1-2 weeks, may consider changing around medications

## 2018-06-18 NOTE — TELEPHONE ENCOUNTER
Pt called with instructions from Dr Galicia Plants   Pt states diarrhea is better   Will call if any farther issues

## 2018-06-19 ENCOUNTER — CLINICAL SUPPORT (OUTPATIENT)
Dept: CARDIAC REHAB | Facility: HOSPITAL | Age: 53
End: 2018-06-19
Attending: INTERNAL MEDICINE
Payer: COMMERCIAL

## 2018-06-19 DIAGNOSIS — I21.3 ST ELEVATION MYOCARDIAL INFARCTION (STEMI), UNSPECIFIED ARTERY (HCC): ICD-10-CM

## 2018-06-19 DIAGNOSIS — Z95.5 S/P CORONARY ARTERY STENT PLACEMENT: ICD-10-CM

## 2018-06-19 PROCEDURE — 93798 PHYS/QHP OP CAR RHAB W/ECG: CPT

## 2018-06-20 ENCOUNTER — CLINICAL SUPPORT (OUTPATIENT)
Dept: CARDIAC REHAB | Facility: HOSPITAL | Age: 53
End: 2018-06-20
Payer: COMMERCIAL

## 2018-06-20 DIAGNOSIS — Z95.5 S/P CORONARY ARTERY STENT PLACEMENT: ICD-10-CM

## 2018-06-20 DIAGNOSIS — I21.3 ST ELEVATION MYOCARDIAL INFARCTION (STEMI), UNSPECIFIED ARTERY (HCC): ICD-10-CM

## 2018-06-20 PROCEDURE — 93798 PHYS/QHP OP CAR RHAB W/ECG: CPT

## 2018-06-22 ENCOUNTER — CLINICAL SUPPORT (OUTPATIENT)
Dept: CARDIAC REHAB | Facility: HOSPITAL | Age: 53
End: 2018-06-22
Payer: COMMERCIAL

## 2018-06-22 DIAGNOSIS — I21.3 ST ELEVATION MYOCARDIAL INFARCTION (STEMI), UNSPECIFIED ARTERY (HCC): ICD-10-CM

## 2018-06-22 DIAGNOSIS — Z95.5 S/P CORONARY ARTERY STENT PLACEMENT: ICD-10-CM

## 2018-06-22 PROCEDURE — 93798 PHYS/QHP OP CAR RHAB W/ECG: CPT

## 2018-06-25 ENCOUNTER — CLINICAL SUPPORT (OUTPATIENT)
Dept: CARDIAC REHAB | Facility: HOSPITAL | Age: 53
End: 2018-06-25
Payer: COMMERCIAL

## 2018-06-25 DIAGNOSIS — Z95.5 STATUS POST CORONARY ARTERY STENT PLACEMENT: ICD-10-CM

## 2018-06-25 DIAGNOSIS — I21.3 ST ELEVATION MYOCARDIAL INFARCTION (STEMI), UNSPECIFIED ARTERY (HCC): ICD-10-CM

## 2018-06-25 PROCEDURE — 93798 PHYS/QHP OP CAR RHAB W/ECG: CPT

## 2018-06-27 ENCOUNTER — CLINICAL SUPPORT (OUTPATIENT)
Dept: CARDIAC REHAB | Facility: HOSPITAL | Age: 53
End: 2018-06-27
Payer: COMMERCIAL

## 2018-06-27 DIAGNOSIS — Z95.5 S/P CORONARY ARTERY STENT PLACEMENT: ICD-10-CM

## 2018-06-27 DIAGNOSIS — I21.3 ST ELEVATION MYOCARDIAL INFARCTION (STEMI), UNSPECIFIED ARTERY (HCC): ICD-10-CM

## 2018-06-27 PROCEDURE — 93798 PHYS/QHP OP CAR RHAB W/ECG: CPT

## 2018-06-29 ENCOUNTER — CLINICAL SUPPORT (OUTPATIENT)
Dept: CARDIAC REHAB | Facility: HOSPITAL | Age: 53
End: 2018-06-29
Payer: COMMERCIAL

## 2018-06-29 VITALS — WEIGHT: 165 LBS | BODY MASS INDEX: 23.68 KG/M2

## 2018-06-29 DIAGNOSIS — Z95.5 S/P CORONARY ARTERY STENT PLACEMENT: ICD-10-CM

## 2018-06-29 DIAGNOSIS — I21.3 ST ELEVATION MYOCARDIAL INFARCTION (STEMI), UNSPECIFIED ARTERY (HCC): ICD-10-CM

## 2018-06-29 PROCEDURE — 93798 PHYS/QHP OP CAR RHAB W/ECG: CPT

## 2018-07-02 ENCOUNTER — CLINICAL SUPPORT (OUTPATIENT)
Dept: CARDIAC REHAB | Facility: HOSPITAL | Age: 53
End: 2018-07-02
Payer: COMMERCIAL

## 2018-07-02 DIAGNOSIS — I21.3 ST ELEVATION MYOCARDIAL INFARCTION (STEMI), UNSPECIFIED ARTERY (HCC): ICD-10-CM

## 2018-07-02 DIAGNOSIS — Z95.5 S/P CORONARY ARTERY STENT PLACEMENT: ICD-10-CM

## 2018-07-02 PROCEDURE — 93798 PHYS/QHP OP CAR RHAB W/ECG: CPT

## 2018-07-02 NOTE — PROGRESS NOTES
Cardiac/Pulmonary Rehabilitation Plan of Care   30 Day Report      Today's date: 2018  Visits: 11  Patient name: Mariana Kinney      : 1965  Age: 48 y o  MRN: [id number]  Referring Physician: Doreen Jordan DO  Provider: [facility] Cardiop[facility]  Clinician: Roni Hooper MS    Dx:   Encounter Diagnoses   Name Primary?  S/P coronary artery stent placement     ST elevation myocardial infarction (STEMI), unspecified artery [facility])      Date of onset: 2018    Comments: Mr Raheem Tripathi has started his CII program 3x/week  He is ready to return to running, biking, and swimming  He has started jogging intervals in rehab and is doing well keeping HR around 130 bpm  He has started some biking on his own at home  Will continue to progress exercise as tolerated by patient  Medication compliance: Yes   Comments: taking medications as prescribed  Fall Risk: No   Comments: no falls to date    EXERCISE/ACTIVITY    Cardiovascular:   Min: 30 min   METS: 4 0-6 0   Hr: 101-141   RPE: 11-13   O2 sat: n/a    Modalities: Treadmill, UBE, Lifecycle, Eliptical , Rower and Recumbent bike  Strength trainin-3 days / week, 12-15 repitations  and 1-2 sets per modality    Modalities: Chest press, Lateral pull down , Lateral raise and Arm curl, arm extension- to incorporate at 3-4 weeks r/t soreness in chest from CPR  EKG changes: none  Dyspnea score: n/a  Home activity: Limited activity at this time due to no knowing restrictions and limitations of exercise  Goals: Start exercising 3x/week at CII for 30 min and incorporate home exercise (running, biking, swimming) 2-3x /week as tolerated within first 30 days of CII  Education: THR/RPE, S/S overexertion, exercise principles, home exercise  Plan: Start CII 3x/week attending regularly and start home exercise 2-3x/week as tolerated  Educate patient on self monitoring and safely exercising    Readiness to change: Action    NUTRITION    Weight control:    Starting weight: 164 lbs   Current weight:     Waist circumference:    Startin   Current:    Diabetes: N/A  Lipid management: patient has history of high cholesterol and family history r/t cholesterol  Labs prior to event LDL: 182, Tri, HDL: 48  2018 LDL: 84, Tri  To have lipids drawn again in 6 weeks  Goals: Manage lipids within normal guidelines with medication, diet, and exercise  Education: lipid management, Diet, label reading, DASH  Plan: Return to regular exercise and follow low fat/chol diet to help manage lipids  Patient education on lipid management  Readiness to change: action    PSYCHOSOCIAL    Emotional: PHQ-9=8- reports sometimes feeling down or anxiety  High score due to not sleeping well and tiredness  Self-reported stress level: 1 - minimal stress right now  Social support: Reports good support from his wife who is a dietician-helping to follow healthy diet  Friends have been supportive since MI    Goals: Continue using stress management and relaxation techniques to positively manage stress  Education: Stress management, relaxation techniques  Plan: Resume regular exercise to use as relaxation technique to manage stress as needed  Readiness to change: action    OTHER CORE COMPONENTS     Tobacco:   History   Smoking Status    Never Smoker   Smokeless Tobacco    Never Used     Comment: No tobacco/smoke exposure     Blood pressure:    Restin/70   Exercise: Recovery BP: 121/78337/72  Goals: n/a  Education: n/a  Plan: n/a  Readiness to change: Maintenance

## 2018-07-06 ENCOUNTER — APPOINTMENT (OUTPATIENT)
Dept: LAB | Facility: MEDICAL CENTER | Age: 53
End: 2018-07-06
Payer: COMMERCIAL

## 2018-07-06 ENCOUNTER — CLINICAL SUPPORT (OUTPATIENT)
Dept: CARDIAC REHAB | Facility: HOSPITAL | Age: 53
End: 2018-07-06
Payer: COMMERCIAL

## 2018-07-06 VITALS — WEIGHT: 165 LBS | BODY MASS INDEX: 23.68 KG/M2

## 2018-07-06 DIAGNOSIS — I25.10 CORONARY ATHEROSCLEROSIS DUE TO CALCIFIED CORONARY LESION: ICD-10-CM

## 2018-07-06 DIAGNOSIS — E55.9 VITAMIN D DEFICIENCY: ICD-10-CM

## 2018-07-06 DIAGNOSIS — I25.84 CORONARY ATHEROSCLEROSIS DUE TO CALCIFIED CORONARY LESION: ICD-10-CM

## 2018-07-06 DIAGNOSIS — Z12.5 PROSTATE CANCER SCREENING: ICD-10-CM

## 2018-07-06 DIAGNOSIS — E78.5 DYSLIPIDEMIA: ICD-10-CM

## 2018-07-06 DIAGNOSIS — I21.3 ST ELEVATION MYOCARDIAL INFARCTION (STEMI), UNSPECIFIED ARTERY (HCC): ICD-10-CM

## 2018-07-06 DIAGNOSIS — Z95.5 S/P CORONARY ARTERY STENT PLACEMENT: ICD-10-CM

## 2018-07-06 DIAGNOSIS — I25.10 CORONARY ARTERY DISEASE INVOLVING NATIVE HEART, ANGINA PRESENCE UNSPECIFIED, UNSPECIFIED VESSEL OR LESION TYPE: ICD-10-CM

## 2018-07-06 DIAGNOSIS — E78.00 HYPERCHOLESTEROLEMIA: ICD-10-CM

## 2018-07-06 LAB
25(OH)D3 SERPL-MCNC: 25 NG/ML (ref 30–100)
ALBUMIN SERPL BCP-MCNC: 3.7 G/DL (ref 3.5–5)
ALP SERPL-CCNC: 77 U/L (ref 46–116)
ALT SERPL W P-5'-P-CCNC: 62 U/L (ref 12–78)
ANION GAP SERPL CALCULATED.3IONS-SCNC: 7 MMOL/L (ref 4–13)
AST SERPL W P-5'-P-CCNC: 31 U/L (ref 5–45)
BILIRUB SERPL-MCNC: 0.69 MG/DL (ref 0.2–1)
BUN SERPL-MCNC: 13 MG/DL (ref 5–25)
CALCIUM SERPL-MCNC: 8.5 MG/DL (ref 8.3–10.1)
CHLORIDE SERPL-SCNC: 104 MMOL/L (ref 100–108)
CHOLEST SERPL-MCNC: 112 MG/DL (ref 50–200)
CO2 SERPL-SCNC: 27 MMOL/L (ref 21–32)
CREAT SERPL-MCNC: 0.96 MG/DL (ref 0.6–1.3)
ERYTHROCYTE [DISTWIDTH] IN BLOOD BY AUTOMATED COUNT: 12.4 % (ref 11.6–15.1)
GFR SERPL CREATININE-BSD FRML MDRD: 91 ML/MIN/1.73SQ M
GLUCOSE P FAST SERPL-MCNC: 93 MG/DL (ref 65–99)
HCT VFR BLD AUTO: 42.9 % (ref 36.5–49.3)
HDLC SERPL-MCNC: 44 MG/DL (ref 40–60)
HGB BLD-MCNC: 14.7 G/DL (ref 12–17)
LDLC SERPL CALC-MCNC: 50 MG/DL (ref 0–100)
MCH RBC QN AUTO: 32.4 PG (ref 26.8–34.3)
MCHC RBC AUTO-ENTMCNC: 34.3 G/DL (ref 31.4–37.4)
MCV RBC AUTO: 95 FL (ref 82–98)
PLATELET # BLD AUTO: 246 THOUSANDS/UL (ref 149–390)
PMV BLD AUTO: 9.5 FL (ref 8.9–12.7)
POTASSIUM SERPL-SCNC: 4 MMOL/L (ref 3.5–5.3)
PROT SERPL-MCNC: 6.9 G/DL (ref 6.4–8.2)
PSA SERPL-MCNC: 2.6 NG/ML (ref 0–4)
RBC # BLD AUTO: 4.54 MILLION/UL (ref 3.88–5.62)
SODIUM SERPL-SCNC: 138 MMOL/L (ref 136–145)
TRIGL SERPL-MCNC: 90 MG/DL
WBC # BLD AUTO: 5.2 THOUSAND/UL (ref 4.31–10.16)

## 2018-07-06 PROCEDURE — G0103 PSA SCREENING: HCPCS

## 2018-07-06 PROCEDURE — 82306 VITAMIN D 25 HYDROXY: CPT

## 2018-07-06 PROCEDURE — 80053 COMPREHEN METABOLIC PANEL: CPT

## 2018-07-06 PROCEDURE — 36415 COLL VENOUS BLD VENIPUNCTURE: CPT

## 2018-07-06 PROCEDURE — 85027 COMPLETE CBC AUTOMATED: CPT

## 2018-07-06 PROCEDURE — 93798 PHYS/QHP OP CAR RHAB W/ECG: CPT

## 2018-07-06 PROCEDURE — 80061 LIPID PANEL: CPT

## 2018-07-09 ENCOUNTER — APPOINTMENT (OUTPATIENT)
Dept: CARDIAC REHAB | Facility: HOSPITAL | Age: 53
End: 2018-07-09
Payer: COMMERCIAL

## 2018-07-09 ENCOUNTER — CLINICAL SUPPORT (OUTPATIENT)
Dept: CARDIAC REHAB | Facility: HOSPITAL | Age: 53
End: 2018-07-09
Payer: COMMERCIAL

## 2018-07-09 DIAGNOSIS — Z95.5 S/P CORONARY ARTERY STENT PLACEMENT: ICD-10-CM

## 2018-07-09 DIAGNOSIS — I21.3 ST ELEVATION MYOCARDIAL INFARCTION (STEMI), UNSPECIFIED ARTERY (HCC): ICD-10-CM

## 2018-07-09 PROCEDURE — 93798 PHYS/QHP OP CAR RHAB W/ECG: CPT

## 2018-07-10 ENCOUNTER — HOSPITAL ENCOUNTER (OUTPATIENT)
Dept: NON INVASIVE DIAGNOSTICS | Facility: CLINIC | Age: 53
Discharge: HOME | End: 2018-07-10
Payer: COMMERCIAL

## 2018-07-10 ENCOUNTER — CLINICAL SUPPORT (OUTPATIENT)
Dept: CARDIAC REHAB | Facility: HOSPITAL | Age: 53
End: 2018-07-10
Payer: COMMERCIAL

## 2018-07-10 ENCOUNTER — OFFICE VISIT (OUTPATIENT)
Dept: CARDIOLOGY CLINIC | Facility: CLINIC | Age: 53
End: 2018-07-10
Payer: COMMERCIAL

## 2018-07-10 VITALS
OXYGEN SATURATION: 98 % | SYSTOLIC BLOOD PRESSURE: 110 MMHG | HEART RATE: 57 BPM | HEIGHT: 70 IN | WEIGHT: 169.4 LBS | BODY MASS INDEX: 24.25 KG/M2 | DIASTOLIC BLOOD PRESSURE: 78 MMHG

## 2018-07-10 DIAGNOSIS — I25.10 CORONARY ARTERY DISEASE INVOLVING NATIVE HEART, ANGINA PRESENCE UNSPECIFIED, UNSPECIFIED VESSEL OR LESION TYPE: ICD-10-CM

## 2018-07-10 DIAGNOSIS — E78.5 DYSLIPIDEMIA: ICD-10-CM

## 2018-07-10 DIAGNOSIS — Z95.5 S/P CORONARY ARTERY STENT PLACEMENT: ICD-10-CM

## 2018-07-10 DIAGNOSIS — I25.10 CORONARY ARTERY DISEASE INVOLVING NATIVE CORONARY ARTERY OF NATIVE HEART WITHOUT ANGINA PECTORIS: ICD-10-CM

## 2018-07-10 DIAGNOSIS — I25.10 CORONARY ARTERY DISEASE INVOLVING NATIVE HEART WITHOUT ANGINA PECTORIS, UNSPECIFIED VESSEL OR LESION TYPE: Primary | ICD-10-CM

## 2018-07-10 DIAGNOSIS — I21.3 ST ELEVATION MYOCARDIAL INFARCTION (STEMI), UNSPECIFIED ARTERY (HCC): ICD-10-CM

## 2018-07-10 PROCEDURE — 99214 OFFICE O/P EST MOD 30 MIN: CPT | Performed by: INTERNAL MEDICINE

## 2018-07-10 PROCEDURE — 93798 PHYS/QHP OP CAR RHAB W/ECG: CPT

## 2018-07-10 PROCEDURE — 93306 TTE W/DOPPLER COMPLETE: CPT

## 2018-07-10 RX ORDER — LISINOPRIL 5 MG/1
5 TABLET ORAL DAILY
Qty: 90 TABLET | Refills: 5 | Status: SHIPPED | OUTPATIENT
Start: 2018-07-10 | End: 2018-11-14

## 2018-07-10 RX ORDER — UBIDECARENONE 200 MG
200 CAPSULE ORAL DAILY
COMMUNITY

## 2018-07-10 NOTE — PROGRESS NOTES
Cardiology Follow up        Khoa Crandall      1965      [id number]        Discussion/Summary:    1  Anterior wall ST-elevation myocardial infarction 4/62/2412, complicated by VF arrest, status post defibrillation x3, therapeutic hypothermia  2  CAD with residual 20% nonobstructive left circumflex disease  3  Dyslipidemia, high suspicion of heterozygous familial hypercholesterolemia (LDL in 2015 up to 182 mg/dL in the setting of regular exercise and extremely healthy lifestyle, father with myocardial infarction at age 46)    · From a symptomatic standpoint this patient is doing very well  Will continue dual anti-platelet treatment for at least 1 year, and likely indefinitely if he continues to do well  Continue lisinopril but will decrease to 5 mg daily to avoid recurrent lightheadedness and hypotension  Will continue metoprolol succinate 25 mg once daily  · Echocardiogram with normal LVEF ~ 60-65% without regional WMA  No valvular disease  · Lipid panel at goal LDL and nonHDL  Continue high dose statin    · Continue cardiac rehabilitation  · PriorECG with nonspecific ST and T-wave abnormality anteriorly, likely secondary to prior infarction  · Patient to call regarding 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  Will schedule as needed for pt to return to work  F/U in 4 months        Interval History: This is a very pleasant 53-year-old male with a history of dyslipidemia and family history of premature CAD with father having myocardial infarction at age 46  He is a very health conscious   On 05/20/2018 several hours into a triathlon, while running, he experienced a cardiac arrest which was witnessed  Fortunately, an AED was applied and he received 3 shocks and subsequent CPR  He presented to [facility] at which time ECG revealed anterior ST elevations    Emergent cardiac catheterization revealed 100% occlusion of proximal LAD and he thereafter underwent PCI/KASSANDRA with 3 5 x 23 mm stent to the proximal LAD  He had 10% mid RCA disease and reported 80% mid circumflex disease  He underwent therapeutic hypothermia with subsequent mild cognitive decline and memory loss  Echocardiogram 05/23/2018 revealed slight improvement in left ventricular ejection fraction at 50%  He was been maintained on aspirin, Brilinta, high-dose atorvastatin, lisinopril 10 mg daily, metoprolol tartrate 25 mg twice daily  Metoprolol was subsequently reduced to once daily succinate formula in light of intermittent lightheadedness  Subsequently started cardiac rehabilitation  Initially, he had significant chest wall pain likely secondary to CPR which has progressively improved  He underwent repeat coronary angiography to address the left circumflex lesion on 06/07/2018  However this was only noted to be 20% stenotic with a widely patent LAD stent, and only luminal irregularities of the mid LAD  RCA was not re-imaged  Review of prior blood work 12/01/2015 reveals very high LDL at 182 mg/dL, elevated triglycerides at 167 mg/dL, HDL of 48  Atorvastatin was optimized 80 mg daily and repeat lipid panel 7/6/18 revealed LDL at 50 mg/dL, nonHDL 68 mg/dL, TG 90 mg/dL, HDL 44 mg/dL  Bisi presents today for follow-up with no new complaints  His chest wall pain has progressively been improving   He is doing very well at cardiac rehabilitation  He denies any exertional chest discomfort, shortness of breath, dizziness, palpitations, lower extremity edema  Vitals:  Vitals:    07/10/18 0832   BP: 110/78   BP Location: Left arm   Patient Position: Sitting   Cuff Size: Adult   Pulse: 57   SpO2: 98%   Weight: 76 8 kg (169 lb 6 4 oz)   Height: 5' 10" (1 778 m)         No past medical history on file    Social History     Social History    Marital status: /Civil Union     Spouse name: N/A    Number of children: N/A    Years of education: N/A Occupational History     [employer]     Full-time     Social History Main Topics    Smoking status: Never Smoker    Smokeless tobacco: Never Used      Comment: No tobacco/smoke exposure    Alcohol use Yes      Comment: Social alcohol use    Drug use: No    Sexual activity: Not on file     Other Topics Concern    Not on file     Social History Narrative    Exercises regularly      Family History   Problem Relation Age of Onset    Cancer Mother     Hypertension Mother     Coronary artery disease Father     Hyperlipidemia Father     Hypertension Brother     Colon cancer Neg Hx      Past Surgical History:   Procedure Laterality Date    HIP SURGERY  2011    Trochanteric fracture requiring nail, stress fx        Current Outpatient Prescriptions:     aspirin 81 mg chewable tablet, Chew 81 mg, Disp: , Rfl:     atorvastatin (LIPITOR) 80 mg tablet, Take 1 tablet (80 mg total) by mouth daily, Disp: 90 tablet, Rfl: 5    Cholecalciferol (VITAMIN D PO), Take 1 capsule by mouth daily, Disp: , Rfl:     Coenzyme Q10 200 MG capsule, Take 200 mg by mouth daily, Disp: , Rfl:     lisinopril (ZESTRIL) 5 mg tablet, Take 1 tablet (5 mg total) by mouth daily for 120 days, Disp: 90 tablet, Rfl: 5    metoprolol succinate (TOPROL-XL) 25 mg 24 hr tablet, Take 1 tablet (25 mg total) by mouth daily, Disp: 30 tablet, Rfl: 5    ticagrelor (BRILINTA) 90 MG, Take 90 mg by mouth every 12 (twelve) hours, Disp: , Rfl:     neomycin-polymyxin-dexamethasone (MAXITROL) ophthalmic suspension, 3 drops in R ear three times a day for 7 days, Disp: 5 mL, Rfl: 0        Review of Systems:  Review of Systems   Respiratory: Negative  Cardiovascular: Negative  Neurological: Negative for light-headedness  All other systems reviewed and are negative  Physical Exam:  Physical Exam   Constitutional: He is oriented to person, place, and time  He appears well-developed and well-nourished  No distress     HENT:   Head: Normocephalic and atraumatic  Eyes: EOM are normal  Pupils are equal, round, and reactive to light  Right eye exhibits no discharge  No scleral icterus  Neck: Normal range of motion  Neck supple  No thyromegaly present  Cardiovascular: Normal rate, regular rhythm and normal heart sounds  Exam reveals no gallop and no friction rub  No murmur heard  Pulmonary/Chest: Effort normal and breath sounds normal    Abdominal: He exhibits no distension  There is no tenderness  There is no rebound and no guarding  Musculoskeletal: Normal range of motion  He exhibits no edema  Neurological: He is alert and oriented to person, place, and time  Coordination normal    Skin: Skin is warm and dry  No rash noted  He is not diaphoretic  No erythema  No pallor  Psychiatric: He has a normal mood and affect   His behavior is normal  Judgment and thought content normal

## 2018-07-11 ENCOUNTER — APPOINTMENT (OUTPATIENT)
Dept: CARDIAC REHAB | Facility: HOSPITAL | Age: 53
End: 2018-07-11
Payer: COMMERCIAL

## 2018-07-11 ENCOUNTER — TELEPHONE (OUTPATIENT)
Dept: FAMILY MEDICINE CLINIC | Facility: CLINIC | Age: 53
End: 2018-07-11

## 2018-07-11 DIAGNOSIS — R79.89 LOW VITAMIN D LEVEL: Primary | ICD-10-CM

## 2018-07-11 DIAGNOSIS — R97.20 ELEVATED PSA: ICD-10-CM

## 2018-07-11 PROCEDURE — 93306 TTE W/DOPPLER COMPLETE: CPT | Performed by: INTERNAL MEDICINE

## 2018-07-11 NOTE — TELEPHONE ENCOUNTER
----- Message from Aisha Hart MD sent at 7/6/2018  4:53 PM EDT -----  Call patient regarding test   Vitamin-D is slightly low  Make sure he takes 2000 international units of vitamin D3 daily  PSA is within normal limits, but on the higher side for age    Check PSA and vitamin D level in 6 months please with a diagnosis of elevated PSA and low vitamin-D

## 2018-07-13 ENCOUNTER — APPOINTMENT (OUTPATIENT)
Dept: CARDIAC REHAB | Facility: HOSPITAL | Age: 53
End: 2018-07-13
Payer: COMMERCIAL

## 2018-07-16 ENCOUNTER — APPOINTMENT (OUTPATIENT)
Dept: CARDIAC REHAB | Facility: HOSPITAL | Age: 53
End: 2018-07-16
Payer: COMMERCIAL

## 2018-07-16 ENCOUNTER — CLINICAL SUPPORT (OUTPATIENT)
Dept: CARDIAC REHAB | Facility: HOSPITAL | Age: 53
End: 2018-07-16
Payer: COMMERCIAL

## 2018-07-16 DIAGNOSIS — I21.3 ST ELEVATION MYOCARDIAL INFARCTION (STEMI), UNSPECIFIED ARTERY (HCC): ICD-10-CM

## 2018-07-16 DIAGNOSIS — Z95.5 S/P CORONARY ARTERY STENT PLACEMENT: ICD-10-CM

## 2018-07-16 PROCEDURE — 93798 PHYS/QHP OP CAR RHAB W/ECG: CPT

## 2018-07-18 ENCOUNTER — APPOINTMENT (OUTPATIENT)
Dept: CARDIAC REHAB | Facility: HOSPITAL | Age: 53
End: 2018-07-18
Payer: COMMERCIAL

## 2018-07-20 ENCOUNTER — TELEPHONE (OUTPATIENT)
Dept: CARDIOLOGY CLINIC | Facility: CLINIC | Age: 53
End: 2018-07-20

## 2018-07-20 ENCOUNTER — CLINICAL SUPPORT (OUTPATIENT)
Dept: CARDIAC REHAB | Facility: HOSPITAL | Age: 53
End: 2018-07-20
Payer: COMMERCIAL

## 2018-07-20 ENCOUNTER — APPOINTMENT (OUTPATIENT)
Dept: CARDIAC REHAB | Facility: HOSPITAL | Age: 53
End: 2018-07-20
Payer: COMMERCIAL

## 2018-07-20 VITALS — BODY MASS INDEX: 23.82 KG/M2 | WEIGHT: 166 LBS

## 2018-07-20 DIAGNOSIS — Z95.5 S/P CORONARY ARTERY STENT PLACEMENT: ICD-10-CM

## 2018-07-20 DIAGNOSIS — I21.3 ST ELEVATION MYOCARDIAL INFARCTION (STEMI), UNSPECIFIED ARTERY (HCC): ICD-10-CM

## 2018-07-20 PROCEDURE — 93798 PHYS/QHP OP CAR RHAB W/ECG: CPT

## 2018-07-20 NOTE — TELEPHONE ENCOUNTER
Received call from patient stating to return to work will need  A nuclear stress and cardiac cath prior for FAA  Will need orders for these tests placed if that is what you want him to have  Thanks  Please advise   Pt also need records of all for FAA   mailed records release to patient so that can be done  Pt also states can not be scheduled before 90 days from original procedure    Bhumi Suho approx 8/20/18

## 2018-07-23 ENCOUNTER — APPOINTMENT (OUTPATIENT)
Dept: CARDIAC REHAB | Facility: HOSPITAL | Age: 53
End: 2018-07-23
Payer: COMMERCIAL

## 2018-07-23 DIAGNOSIS — I25.10 CORONARY ARTERY DISEASE INVOLVING NATIVE HEART WITHOUT ANGINA PECTORIS, UNSPECIFIED VESSEL OR LESION TYPE: Primary | ICD-10-CM

## 2018-07-23 NOTE — TELEPHONE ENCOUNTER
OK will place order for both, exercise nuclear stress test and cath  Plz schedule after 90 day period    THx

## 2018-07-24 ENCOUNTER — CLINICAL SUPPORT (OUTPATIENT)
Dept: CARDIAC REHAB | Facility: HOSPITAL | Age: 53
End: 2018-07-24
Payer: COMMERCIAL

## 2018-07-24 DIAGNOSIS — I25.10 CORONARY ATHEROSCLEROSIS DUE TO CALCIFIED CORONARY LESION: ICD-10-CM

## 2018-07-24 DIAGNOSIS — I25.84 CORONARY ATHEROSCLEROSIS DUE TO CALCIFIED CORONARY LESION: ICD-10-CM

## 2018-07-24 PROCEDURE — 93798 PHYS/QHP OP CAR RHAB W/ECG: CPT

## 2018-07-25 ENCOUNTER — APPOINTMENT (OUTPATIENT)
Dept: CARDIAC REHAB | Facility: HOSPITAL | Age: 53
End: 2018-07-25
Payer: COMMERCIAL

## 2018-07-27 ENCOUNTER — APPOINTMENT (OUTPATIENT)
Dept: CARDIAC REHAB | Facility: HOSPITAL | Age: 53
End: 2018-07-27
Payer: COMMERCIAL

## 2018-07-27 ENCOUNTER — CLINICAL SUPPORT (OUTPATIENT)
Dept: CARDIAC REHAB | Facility: HOSPITAL | Age: 53
End: 2018-07-27
Payer: COMMERCIAL

## 2018-07-27 DIAGNOSIS — Z95.5 STATUS POST CORONARY ARTERY STENT PLACEMENT: ICD-10-CM

## 2018-07-27 DIAGNOSIS — I21.3 ST ELEVATION MYOCARDIAL INFARCTION (STEMI), UNSPECIFIED ARTERY (HCC): ICD-10-CM

## 2018-07-27 PROCEDURE — 93798 PHYS/QHP OP CAR RHAB W/ECG: CPT

## 2018-07-30 ENCOUNTER — APPOINTMENT (OUTPATIENT)
Dept: CARDIAC REHAB | Facility: HOSPITAL | Age: 53
End: 2018-07-30
Payer: COMMERCIAL

## 2018-08-01 ENCOUNTER — APPOINTMENT (OUTPATIENT)
Dept: CARDIAC REHAB | Facility: HOSPITAL | Age: 53
End: 2018-08-01
Payer: COMMERCIAL

## 2018-08-02 ENCOUNTER — CLINICAL SUPPORT (OUTPATIENT)
Dept: CARDIAC REHAB | Facility: HOSPITAL | Age: 53
End: 2018-08-02
Payer: COMMERCIAL

## 2018-08-02 DIAGNOSIS — I21.3 ST ELEVATION MYOCARDIAL INFARCTION (STEMI), UNSPECIFIED ARTERY (HCC): ICD-10-CM

## 2018-08-02 DIAGNOSIS — Z95.5 STATUS POST CORONARY ARTERY STENT PLACEMENT: ICD-10-CM

## 2018-08-02 PROCEDURE — 93798 PHYS/QHP OP CAR RHAB W/ECG: CPT

## 2018-08-03 ENCOUNTER — CLINICAL SUPPORT (OUTPATIENT)
Dept: CARDIAC REHAB | Facility: HOSPITAL | Age: 53
End: 2018-08-03
Payer: COMMERCIAL

## 2018-08-03 ENCOUNTER — APPOINTMENT (OUTPATIENT)
Dept: CARDIAC REHAB | Facility: HOSPITAL | Age: 53
End: 2018-08-03
Payer: COMMERCIAL

## 2018-08-03 VITALS — WEIGHT: 166 LBS | BODY MASS INDEX: 23.82 KG/M2

## 2018-08-03 DIAGNOSIS — Z95.820 S/P ANGIOPLASTY WITH STENT: Primary | ICD-10-CM

## 2018-08-03 DIAGNOSIS — Z95.5 S/P CORONARY ARTERY STENT PLACEMENT: ICD-10-CM

## 2018-08-03 DIAGNOSIS — I21.3 ST ELEVATION MYOCARDIAL INFARCTION (STEMI), UNSPECIFIED ARTERY (HCC): ICD-10-CM

## 2018-08-03 PROCEDURE — 93798 PHYS/QHP OP CAR RHAB W/ECG: CPT

## 2018-08-03 NOTE — PROGRESS NOTES
Cardiac Rehabilitation Plan of Care   60 Day Report      Today's date: 2018  Visits: 20  Patient name: Reji Alamo      : 1965  Age: 48 y o  MRN: [id number]  Referring Physician: Jose D Kulkarni DO  Provider: [facility]  Clinician: Nasreen Larkin MS    Dx:   Encounter Diagnoses   Name Primary?  S/P coronary artery stent placement     ST elevation myocardial infarction (STEMI), unspecified artery [facility])      Date of onset: 2018    Comments: Mr Kenny Diego has attended 20 sessions of his CII program  He has returned to running, biking, and swimming  He has started jogging intervals in rehab and is doing well keeping HR around 130-145 bpm  He has started some biking on his own at home  Will continue to progress exercise as tolerated by patient  He is hoping to return to work as a  as soon as possible  Medication compliance: Yes   Comments: taking medications as prescribed  Fall Risk: No   Comments: no falls to date    EXERCISE/ACTIVITY    Cardiovascular:   Min: 40-5- min   METS: 4 0-6 4   Hr: 101-141   RPE: 11-13   O2 sat: n/a    Modalities: Treadmill, UBE, Lifecycle, Eliptical , Rower and Recumbent bike  Strength trainin-3 days / week, 12-15 repitations  and 1-2 sets per modality    Modalities: Chest press, Lateral pull down , Lateral raise and Arm curl, arm extension- to incorporate at 3-4 weeks r/t soreness in chest from CPR  EKG changes: none  Dyspnea score: n/a  Home activity: Patient has started jogging, walking, and swimming on his own and feeling confident with exercise on his own  He plans to continue adding days and time on to exercise as tolerated  Goals: Start exercising 3x/week at CII for 30 min and incorporate home exercise (running, biking, swimming) 2-3x /week as tolerated within first 30 days of CII    Education: THR/RPE, S/S overexertion, exercise principles, home exercise  Plan: Start CII 3x/week attending regularly and start home exercise 2-3x/week as tolerated  Educate patient on self monitoring and safely exercising  Readiness to change: Action    NUTRITION    Weight control:    Starting weight: 164 lbs   Current weight: Weight - Scale: 75 3 kg (166 lb)   Waist circumference:    Startin   Current:    Diabetes: N/A  Lipid management: patient has history of high cholesterol and family history r/t cholesterol  Labs prior to event LDL: 182, Tri, HDL: 48  2018 LDL: 84, Tri  To have lipids drawn again in 6 weeks  Goals: Manage lipids within normal guidelines with medication, diet, and exercise  Education: lipid management, Diet, label reading, DASH  Plan: Return to regular exercise and follow low fat/chol diet to help manage lipids  Patient education on lipid management  Readiness to change: action    PSYCHOSOCIAL    Emotional: PHQ-9=8- reports sometimes feeling down or anxiety  High score due to not sleeping well and tiredness  Self-reported stress level: 1 - minimal stress right now  Social support: Reports good support from his wife who is a dietician-helping to follow healthy diet  Friends have been supportive since MI    Goals: Continue using stress management and relaxation techniques to positively manage stress  Education: Stress management, relaxation techniques  Plan: Resume regular exercise to use as relaxation technique to manage stress as needed  Readiness to change: action    OTHER CORE COMPONENTS     Tobacco:   History   Smoking Status    Never Smoker   Smokeless Tobacco    Never Used     Comment: No tobacco/smoke exposure     Blood pressure:    Restin/70   Exercise: Recovery BP: 110/56645/72  Goals: n/a  Education: n/a  Plan: n/a  Readiness to change: Maintenance

## 2018-08-06 ENCOUNTER — APPOINTMENT (OUTPATIENT)
Dept: CARDIAC REHAB | Facility: HOSPITAL | Age: 53
End: 2018-08-06
Payer: COMMERCIAL

## 2018-08-06 PROBLEM — I25.10 ATHEROSCLEROTIC HEART DISEASE OF NATIVE CORONARY ARTERY WITHOUT ANGINA PECTORIS: Status: ACTIVE | Noted: 2018-06-07

## 2018-08-06 PROBLEM — I21.3 STEMI (ST ELEVATION MYOCARDIAL INFARCTION) (HCC): Status: ACTIVE | Noted: 2018-05-20

## 2018-08-06 PROBLEM — G93.40 ENCEPHALOPATHY ACUTE: Status: ACTIVE | Noted: 2018-05-20

## 2018-08-06 PROBLEM — I46.9 CARDIAC ARREST (HCC): Status: ACTIVE | Noted: 2018-05-20

## 2018-08-06 NOTE — PROGRESS NOTES
[facility]  FAMILY PRACTICE ACUTE OFFICE VISIT       NAME: Solomon Chavira  AGE: 48 y o  SEX: male       : 1965        MRN: [id number]    DATE: 2018  TIME: 5:01 PM    Assessment and Plan     Problem List Items Addressed This Visit     None      Visit Diagnoses     Abscess of buttock, right    -  Primary    Relevant Medications    cephalexin (KEFLEX) 500 mg capsule    Other Relevant Orders    Wound culture and Gram stain    Incision and Drainage        Patient Instructions   I reviewed with the patient that he has an abscess in the right walk with overlying cellulitis  The area was I&D'd with a moderate amount of drainage, mostly bloody  A swab was taken of the purulent / bloody discharge and sent for culture  He was started on cephalexin 1 tablet every 6 hr times 10 days  He was directed to keep the area covered while the packing was in place  He opted to have his wife remove it rather than return for removal/re-evaluation as there was not much packing placed  She will remove that within the next 2 days  He is encouraged to keep the area covered and to only shower  We reviewed that should also be covered until the area heals over  He should not be taking any baths until this resolves  He was encouraged to call with any concerns such as worsening symptoms  We discussed that his cardiac rehab should avoid any activities causing increased fraction or irritation to the right buttock for least the rest of the week  Chief Complaint     Chief Complaint   Patient presents with    Skin Lesion     right buttocks       History of Present Illness   Solomon Chavira is a 48y o -year-old male who presents for persistent boil       Notes a boil on the right buttock for the last 4 days - no fever - has drainage - tried soaks at nighttime - has pain with change in position - trying Neosporin  - nothing like this in the past - denies any trauma or insect bite in the area Review of Systems   Review of Systems   Constitutional: Negative for chills and fever  Gastrointestinal: Negative for nausea and vomiting  Skin:        "boil" on right buttock       Active Problem List     Patient Active Problem List   Diagnosis    Vitamin D deficiency    Hypercholesterolemia    Coronary atherosclerosis    Atypical nevi    Microscopic hematuria    Atherosclerotic heart disease of native coronary artery without angina pectoris    [facility]    Encephalopathy acute    STEMI [facility]ST elevation [facility] infarction) [facility] )         Social History:  Social History     Social History    Marital status: /Civil Union     Spouse name: N/A    Number of children: N/A    Years of education: N/A     Occupational History     [employer]     Full-time     Social History Main Topics    Smoking status: Never Smoker    Smokeless tobacco: Never Used      Comment: No tobacco/smoke exposure    Alcohol use Yes      Comment: Social alcohol use    Drug use: No    Sexual activity: Not on file     Other Topics Concern    Not on file     Social History Narrative    Exercises regularly       Objective     Vitals:    08/07/18 1046   BP: 106/80   Pulse: 66   Temp: 98 1 °F (36 7 °C)   SpO2: 97%     Wt Readings from Last 3 Encounters:   08/07/18 76 8 kg (169 lb 4 oz)   08/03/18 75 3 kg (166 lb)   07/20/18 75 3 kg (166 lb)       Physical Exam   Constitutional: He appears well-developed and well-nourished  HENT:   Left Ear: External ear normal    Pulmonary/Chest: Effort normal    Skin:   2 cm abscess in right medial buttock with overlying erythema and warmth   Vitals reviewed        Incision and Drainage  Date/Time: 8/7/2018 1:31 PM  Performed by: Trevor Wakefield by: Kiara Robb     Patient location:  Clinic  Other Assisting Provider: No    Consent:     Consent obtained:  Verbal    Consent given by:  Patient    Risks discussed:  Pain and bleeding  Location: Type:  Abscess    Location: Right buttock  Pre-procedure details:     Procedure prep: Alcohol swabs  Procedure details:     Complexity:  Simple    Needle aspiration: no      Incision types:  Stab incision    Scalpel blade:  11    Approach:  Puncture    Incision depth:  Subcutaneous    Drainage:  Bloody and purulent    Drainage amount: Moderate    Wound treatment:  Packing placed    Packing materials:  1/4 in gauze  Post-procedure details:     Patient tolerance of procedure: Tolerated well, no immediate complications (patient noted some discomfort with packing)              Pertinent Laboratory/Diagnostic Studies:  Results for orders placed or performed in visit on 07/06/18   PSA   Result Value Ref Range    PSA 2 6 0 0 - 4 0 ng/mL   Comprehensive metabolic panel   Result Value Ref Range    Sodium 138 136 - 145 mmol/L    Potassium 4 0 3 5 - 5 3 mmol/L    Chloride 104 100 - 108 mmol/L    CO2 27 21 - 32 mmol/L    Anion Gap 7 4 - 13 mmol/L    BUN 13 5 - 25 mg/dL    Creatinine 0 96 0 60 - 1 30 mg/dL    Glucose, Fasting 93 65 - 99 mg/dL    Calcium 8 5 8 3 - 10 1 mg/dL    AST 31 5 - 45 U/L    ALT 62 12 - 78 U/L    Alkaline Phosphatase 77 46 - 116 U/L    Total Protein 6 9 6 4 - 8 2 g/dL    Albumin 3 7 3 5 - 5 0 g/dL    Total Bilirubin 0 69 0 20 - 1 00 mg/dL    eGFR 91 ml/min/1 73sq m   Lipid Panel with Direct LDL reflex   Result Value Ref Range    Cholesterol 112 50 - 200 mg/dL    Triglycerides 90 <=150 mg/dL    HDL, Direct 44 40 - 60 mg/dL    LDL Calculated 50 0 - 100 mg/dL   CBC   Result Value Ref Range    WBC 5 20 4  31 - 10 16 Thousand/uL    RBC 4 54 3 88 - 5 62 Million/uL    Hemoglobin 14 7 12 0 - 17 0 g/dL    Hematocrit 42 9 36 5 - 49 3 %    MCV 95 82 - 98 fL    MCH 32 4 26 8 - 34 3 pg    MCHC 34 3 31 4 - 37 4 g/dL    RDW 12 4 11 6 - 15 1 %    Platelets 567 418 - 166 Thousands/uL    MPV 9 5 8 9 - 12 7 fL   Vitamin D 25 hydroxy   Result Value Ref Range    Vit D, 25-Hydroxy 25 0 (L) 30 0 - 100 0 ng/mL       Orders Placed This Encounter   Procedures    Incision and Drainage    Wound culture and Gram stain       ALLERGIES:  No Known Allergies    Current Medications     Current Outpatient Prescriptions   Medication Sig Dispense Refill    aspirin 81 mg chewable tablet Chew 81 mg      atorvastatin (LIPITOR) 80 mg tablet Take 1 tablet (80 mg total) by mouth daily 90 tablet 5    Cholecalciferol (VITAMIN D PO) Take 1 capsule by mouth daily      Coenzyme Q10 200 MG capsule Take 200 mg by mouth daily      lisinopril (ZESTRIL) 5 mg tablet Take 1 tablet (5 mg total) by mouth daily for 120 days 90 tablet 5    metoprolol succinate (TOPROL-XL) 25 mg 24 hr tablet Take 1 tablet (25 mg total) by mouth daily 30 tablet 5    ticagrelor (BRILINTA) 90 MG Take 90 mg by mouth every 12 (twelve) hours      cephalexin (KEFLEX) 500 mg capsule Take 1 capsule (500 mg total) by mouth every 6 (six) hours for 10 days 40 capsule 0     No current facility-administered medications for this visit            Leopoldo Mocha, PA-C  8/7/2018 5:01 PM  [facility]

## 2018-08-07 ENCOUNTER — OFFICE VISIT (OUTPATIENT)
Dept: FAMILY MEDICINE CLINIC | Facility: CLINIC | Age: 53
End: 2018-08-07
Payer: COMMERCIAL

## 2018-08-07 ENCOUNTER — CLINICAL SUPPORT (OUTPATIENT)
Dept: CARDIAC REHAB | Facility: HOSPITAL | Age: 53
End: 2018-08-07
Payer: COMMERCIAL

## 2018-08-07 VITALS
HEIGHT: 70 IN | WEIGHT: 169.25 LBS | BODY MASS INDEX: 24.23 KG/M2 | DIASTOLIC BLOOD PRESSURE: 80 MMHG | OXYGEN SATURATION: 97 % | TEMPERATURE: 98.1 F | HEART RATE: 66 BPM | SYSTOLIC BLOOD PRESSURE: 106 MMHG

## 2018-08-07 DIAGNOSIS — L02.31 ABSCESS OF BUTTOCK, RIGHT: Primary | ICD-10-CM

## 2018-08-07 DIAGNOSIS — I21.3 ST ELEVATION MYOCARDIAL INFARCTION (STEMI), UNSPECIFIED ARTERY (HCC): ICD-10-CM

## 2018-08-07 PROCEDURE — 99213 OFFICE O/P EST LOW 20 MIN: CPT | Performed by: PHYSICIAN ASSISTANT

## 2018-08-07 PROCEDURE — 10060 I&D ABSCESS SIMPLE/SINGLE: CPT | Performed by: PHYSICIAN ASSISTANT

## 2018-08-07 PROCEDURE — 87070 CULTURE OTHR SPECIMN AEROBIC: CPT | Performed by: PHYSICIAN ASSISTANT

## 2018-08-07 PROCEDURE — 87205 SMEAR GRAM STAIN: CPT | Performed by: PHYSICIAN ASSISTANT

## 2018-08-07 PROCEDURE — 93798 PHYS/QHP OP CAR RHAB W/ECG: CPT

## 2018-08-07 RX ORDER — CEPHALEXIN 500 MG/1
500 CAPSULE ORAL EVERY 6 HOURS SCHEDULED
Qty: 40 CAPSULE | Refills: 0 | Status: SHIPPED | OUTPATIENT
Start: 2018-08-07 | End: 2018-08-17

## 2018-08-07 NOTE — PATIENT INSTRUCTIONS
I reviewed with the patient that he has an abscess in the right walk with overlying cellulitis  The area was I&D'd with a moderate amount of drainage, mostly bloody  A swab was taken of the purulent / bloody discharge and sent for culture  He was started on cephalexin 1 tablet every 6 hr times 10 days  He was directed to keep the area covered while the packing was in place  He opted to have his wife remove it rather than return for removal/re-evaluation as there was not much packing placed  She will remove that within the next 2 days  He is encouraged to keep the area covered and to only shower  We reviewed that should also be covered until the area heals over  He should not be taking any baths until this resolves  He was encouraged to call with any concerns such as worsening symptoms  We discussed that his cardiac rehab should avoid any activities causing increased fraction or irritation to the right buttock for least the rest of the week

## 2018-08-08 ENCOUNTER — APPOINTMENT (OUTPATIENT)
Dept: CARDIAC REHAB | Facility: HOSPITAL | Age: 53
End: 2018-08-08
Payer: COMMERCIAL

## 2018-08-09 LAB
BACTERIA WND AEROBE CULT: ABNORMAL
GRAM STN SPEC: ABNORMAL
GRAM STN SPEC: ABNORMAL

## 2018-08-10 ENCOUNTER — APPOINTMENT (OUTPATIENT)
Dept: CARDIAC REHAB | Facility: HOSPITAL | Age: 53
End: 2018-08-10
Payer: COMMERCIAL

## 2018-08-13 ENCOUNTER — CLINICAL SUPPORT (OUTPATIENT)
Dept: CARDIAC REHAB | Facility: HOSPITAL | Age: 53
End: 2018-08-13
Payer: COMMERCIAL

## 2018-08-13 ENCOUNTER — APPOINTMENT (OUTPATIENT)
Dept: CARDIAC REHAB | Facility: HOSPITAL | Age: 53
End: 2018-08-13
Payer: COMMERCIAL

## 2018-08-13 DIAGNOSIS — Z95.5 S/P CORONARY ARTERY STENT PLACEMENT: ICD-10-CM

## 2018-08-13 DIAGNOSIS — I21.3 ST ELEVATION MYOCARDIAL INFARCTION (STEMI), UNSPECIFIED ARTERY (HCC): ICD-10-CM

## 2018-08-13 PROCEDURE — 93798 PHYS/QHP OP CAR RHAB W/ECG: CPT

## 2018-08-15 ENCOUNTER — APPOINTMENT (OUTPATIENT)
Dept: CARDIAC REHAB | Facility: HOSPITAL | Age: 53
End: 2018-08-15
Payer: COMMERCIAL

## 2018-08-17 ENCOUNTER — CLINICAL SUPPORT (OUTPATIENT)
Dept: CARDIAC REHAB | Facility: HOSPITAL | Age: 53
End: 2018-08-17
Payer: COMMERCIAL

## 2018-08-17 ENCOUNTER — APPOINTMENT (OUTPATIENT)
Dept: CARDIAC REHAB | Facility: HOSPITAL | Age: 53
End: 2018-08-17
Payer: COMMERCIAL

## 2018-08-17 VITALS — WEIGHT: 170 LBS | BODY MASS INDEX: 24.39 KG/M2

## 2018-08-17 DIAGNOSIS — I21.3 ST ELEVATION MYOCARDIAL INFARCTION (STEMI), UNSPECIFIED ARTERY (HCC): ICD-10-CM

## 2018-08-17 DIAGNOSIS — Z95.5 S/P CORONARY ARTERY STENT PLACEMENT: ICD-10-CM

## 2018-08-17 PROCEDURE — 93798 PHYS/QHP OP CAR RHAB W/ECG: CPT

## 2018-08-20 ENCOUNTER — CLINICAL SUPPORT (OUTPATIENT)
Dept: CARDIAC REHAB | Facility: HOSPITAL | Age: 53
End: 2018-08-20
Payer: COMMERCIAL

## 2018-08-20 ENCOUNTER — APPOINTMENT (OUTPATIENT)
Dept: CARDIAC REHAB | Facility: HOSPITAL | Age: 53
End: 2018-08-20
Payer: COMMERCIAL

## 2018-08-20 DIAGNOSIS — I21.3 ST ELEVATION MYOCARDIAL INFARCTION (STEMI), UNSPECIFIED ARTERY (HCC): ICD-10-CM

## 2018-08-20 DIAGNOSIS — Z95.5 S/P CORONARY ARTERY STENT PLACEMENT: ICD-10-CM

## 2018-08-20 PROCEDURE — 93798 PHYS/QHP OP CAR RHAB W/ECG: CPT

## 2018-08-22 ENCOUNTER — HOSPITAL ENCOUNTER (OUTPATIENT)
Dept: NUCLEAR MEDICINE | Facility: HOSPITAL | Age: 53
Discharge: HOME | End: 2018-08-22
Attending: INTERNAL MEDICINE
Payer: COMMERCIAL

## 2018-08-22 ENCOUNTER — HOSPITAL ENCOUNTER (OUTPATIENT)
Dept: NON INVASIVE DIAGNOSTICS | Facility: HOSPITAL | Age: 53
Discharge: HOME | End: 2018-08-22
Attending: INTERNAL MEDICINE
Payer: COMMERCIAL

## 2018-08-22 ENCOUNTER — APPOINTMENT (OUTPATIENT)
Dept: CARDIAC REHAB | Facility: HOSPITAL | Age: 53
End: 2018-08-22
Payer: COMMERCIAL

## 2018-08-22 DIAGNOSIS — E78.5 DYSLIPIDEMIA: ICD-10-CM

## 2018-08-22 DIAGNOSIS — I25.10 CORONARY ARTERY DISEASE INVOLVING NATIVE HEART WITHOUT ANGINA PECTORIS, UNSPECIFIED VESSEL OR LESION TYPE: ICD-10-CM

## 2018-08-22 DIAGNOSIS — I25.10 CORONARY ARTERY DISEASE INVOLVING NATIVE HEART, ANGINA PRESENCE UNSPECIFIED, UNSPECIFIED VESSEL OR LESION TYPE: ICD-10-CM

## 2018-08-22 DIAGNOSIS — I10 BENIGN ESSENTIAL HTN: ICD-10-CM

## 2018-08-22 PROCEDURE — 93016 CV STRESS TEST SUPVJ ONLY: CPT | Performed by: INTERNAL MEDICINE

## 2018-08-22 PROCEDURE — 78452 HT MUSCLE IMAGE SPECT MULT: CPT | Performed by: INTERNAL MEDICINE

## 2018-08-22 PROCEDURE — A9502 TC99M TETROFOSMIN: HCPCS

## 2018-08-22 PROCEDURE — 78452 HT MUSCLE IMAGE SPECT MULT: CPT

## 2018-08-22 PROCEDURE — 93018 CV STRESS TEST I&R ONLY: CPT | Performed by: INTERNAL MEDICINE

## 2018-08-22 PROCEDURE — 93017 CV STRESS TEST TRACING ONLY: CPT

## 2018-08-22 RX ORDER — ATORVASTATIN CALCIUM 80 MG/1
80 TABLET, FILM COATED ORAL DAILY
Qty: 90 TABLET | Refills: 1
Start: 2018-08-22 | End: 2020-06-26 | Stop reason: SDUPTHER

## 2018-08-22 RX ORDER — METOPROLOL SUCCINATE 25 MG/1
25 TABLET, EXTENDED RELEASE ORAL DAILY
Qty: 90 TABLET | Refills: 1 | Status: SHIPPED | OUTPATIENT
Start: 2018-08-22 | End: 2018-11-14

## 2018-08-23 ENCOUNTER — TELEPHONE (OUTPATIENT)
Dept: SURGERY | Facility: HOSPITAL | Age: 53
End: 2018-08-23

## 2018-08-23 LAB
CHEST PAIN STATEMENT: NORMAL
MAX DIASTOLIC BP: 84 MMHG
MAX HEART RATE: 157 BPM
MAX PREDICTED HEART RATE: 169 BPM
MAX. SYSTOLIC BP: 192 MMHG
PROTOCOL NAME: NORMAL
REASON FOR TERMINATION: NORMAL
TARGET HR FORMULA: NORMAL
TEST INDICATION: NORMAL
TIME IN EXERCISE PHASE: NORMAL

## 2018-08-23 RX ORDER — SODIUM CHLORIDE 9 MG/ML
125 INJECTION, SOLUTION INTRAVENOUS ONCE
Status: CANCELLED | OUTPATIENT
Start: 2018-08-24 | End: 2018-08-24

## 2018-08-24 ENCOUNTER — HOSPITAL ENCOUNTER (OUTPATIENT)
Dept: NON INVASIVE DIAGNOSTICS | Facility: HOSPITAL | Age: 53
Discharge: HOME | End: 2018-08-24
Attending: INTERNAL MEDICINE
Payer: COMMERCIAL

## 2018-08-24 ENCOUNTER — APPOINTMENT (OUTPATIENT)
Dept: CARDIAC REHAB | Facility: HOSPITAL | Age: 53
End: 2018-08-24
Payer: COMMERCIAL

## 2018-08-24 ENCOUNTER — TELEPHONE (OUTPATIENT)
Dept: SURGERY | Facility: HOSPITAL | Age: 53
End: 2018-08-24

## 2018-08-24 VITALS
HEIGHT: 70 IN | OXYGEN SATURATION: 97 % | BODY MASS INDEX: 24.34 KG/M2 | RESPIRATION RATE: 16 BRPM | DIASTOLIC BLOOD PRESSURE: 59 MMHG | TEMPERATURE: 97.7 F | WEIGHT: 170 LBS | HEART RATE: 48 BPM | SYSTOLIC BLOOD PRESSURE: 95 MMHG

## 2018-08-24 DIAGNOSIS — I25.10 CORONARY ARTERY DISEASE INVOLVING NATIVE HEART WITHOUT ANGINA PECTORIS, UNSPECIFIED VESSEL OR LESION TYPE: ICD-10-CM

## 2018-08-24 LAB
ANION GAP SERPL CALCULATED.3IONS-SCNC: 9 MMOL/L (ref 4–13)
BUN SERPL-MCNC: 17 MG/DL (ref 5–25)
CALCIUM SERPL-MCNC: 9 MG/DL (ref 8.3–10.1)
CHLORIDE SERPL-SCNC: 103 MMOL/L (ref 100–108)
CO2 SERPL-SCNC: 26 MMOL/L (ref 21–32)
CREAT SERPL-MCNC: 0.9 MG/DL (ref 0.6–1.3)
ERYTHROCYTE [DISTWIDTH] IN BLOOD BY AUTOMATED COUNT: 12.8 % (ref 11.6–15.1)
GFR SERPL CREATININE-BSD FRML MDRD: 99 ML/MIN/1.73SQ M
GLUCOSE P FAST SERPL-MCNC: 98 MG/DL (ref 65–99)
GLUCOSE SERPL-MCNC: 98 MG/DL (ref 65–140)
HCT VFR BLD AUTO: 45.9 % (ref 36.5–49.3)
HGB BLD-MCNC: 15.6 G/DL (ref 12–17)
MCH RBC QN AUTO: 32.4 PG (ref 26.8–34.3)
MCHC RBC AUTO-ENTMCNC: 34 G/DL (ref 31.4–37.4)
MCV RBC AUTO: 95 FL (ref 82–98)
PLATELET # BLD AUTO: 233 THOUSANDS/UL (ref 149–390)
PMV BLD AUTO: 9.6 FL (ref 8.9–12.7)
POTASSIUM SERPL-SCNC: 4.4 MMOL/L (ref 3.5–5.3)
RBC # BLD AUTO: 4.82 MILLION/UL (ref 3.88–5.62)
SODIUM SERPL-SCNC: 138 MMOL/L (ref 136–145)
WBC # BLD AUTO: 5.61 THOUSAND/UL (ref 4.31–10.16)

## 2018-08-24 PROCEDURE — 99153 MOD SED SAME PHYS/QHP EA: CPT | Performed by: INTERNAL MEDICINE

## 2018-08-24 PROCEDURE — C1887 CATHETER, GUIDING: HCPCS | Performed by: INTERNAL MEDICINE

## 2018-08-24 PROCEDURE — 93454 CORONARY ARTERY ANGIO S&I: CPT | Performed by: INTERNAL MEDICINE

## 2018-08-24 PROCEDURE — C1769 GUIDE WIRE: HCPCS | Performed by: INTERNAL MEDICINE

## 2018-08-24 PROCEDURE — 85027 COMPLETE CBC AUTOMATED: CPT | Performed by: PHYSICIAN ASSISTANT

## 2018-08-24 PROCEDURE — 93571 IV DOP VEL&/PRESS C FLO 1ST: CPT | Performed by: INTERNAL MEDICINE

## 2018-08-24 PROCEDURE — 99152 MOD SED SAME PHYS/QHP 5/>YRS: CPT | Performed by: INTERNAL MEDICINE

## 2018-08-24 PROCEDURE — 80048 BASIC METABOLIC PNL TOTAL CA: CPT | Performed by: PHYSICIAN ASSISTANT

## 2018-08-24 PROCEDURE — C1894 INTRO/SHEATH, NON-LASER: HCPCS | Performed by: INTERNAL MEDICINE

## 2018-08-24 RX ORDER — SODIUM CHLORIDE 9 MG/ML
100 INJECTION, SOLUTION INTRAVENOUS CONTINUOUS
Status: ACTIVE | OUTPATIENT
Start: 2018-08-24 | End: 2018-08-24

## 2018-08-24 RX ORDER — MIDAZOLAM HYDROCHLORIDE 1 MG/ML
INJECTION INTRAMUSCULAR; INTRAVENOUS CODE/TRAUMA/SEDATION MEDICATION
Status: COMPLETED | OUTPATIENT
Start: 2018-08-24 | End: 2018-08-24

## 2018-08-24 RX ORDER — ASPIRIN 81 MG/1
81 TABLET, CHEWABLE ORAL DAILY
Status: DISCONTINUED | OUTPATIENT
Start: 2018-08-24 | End: 2018-08-25 | Stop reason: HOSPADM

## 2018-08-24 RX ORDER — UBIDECARENONE 200 MG
200 CAPSULE ORAL DAILY
Status: DISCONTINUED | OUTPATIENT
Start: 2018-08-24 | End: 2018-08-25 | Stop reason: HOSPADM

## 2018-08-24 RX ORDER — VERAPAMIL HYDROCHLORIDE 2.5 MG/ML
INJECTION, SOLUTION INTRAVENOUS CODE/TRAUMA/SEDATION MEDICATION
Status: COMPLETED | OUTPATIENT
Start: 2018-08-24 | End: 2018-08-24

## 2018-08-24 RX ORDER — HEPARIN SODIUM 1000 [USP'U]/ML
INJECTION, SOLUTION INTRAVENOUS; SUBCUTANEOUS CODE/TRAUMA/SEDATION MEDICATION
Status: COMPLETED | OUTPATIENT
Start: 2018-08-24 | End: 2018-08-24

## 2018-08-24 RX ORDER — LIDOCAINE HYDROCHLORIDE 10 MG/ML
INJECTION, SOLUTION INFILTRATION; PERINEURAL CODE/TRAUMA/SEDATION MEDICATION
Status: COMPLETED | OUTPATIENT
Start: 2018-08-24 | End: 2018-08-24

## 2018-08-24 RX ORDER — METOPROLOL SUCCINATE 25 MG/1
25 TABLET, EXTENDED RELEASE ORAL DAILY
Status: DISCONTINUED | OUTPATIENT
Start: 2018-08-24 | End: 2018-08-25 | Stop reason: HOSPADM

## 2018-08-24 RX ORDER — SODIUM CHLORIDE 9 MG/ML
125 INJECTION, SOLUTION INTRAVENOUS ONCE
Status: COMPLETED | OUTPATIENT
Start: 2018-08-24 | End: 2018-08-24

## 2018-08-24 RX ORDER — NITROGLYCERIN 20 MG/100ML
INJECTION INTRAVENOUS CODE/TRAUMA/SEDATION MEDICATION
Status: COMPLETED | OUTPATIENT
Start: 2018-08-24 | End: 2018-08-24

## 2018-08-24 RX ORDER — LISINOPRIL 5 MG/1
5 TABLET ORAL DAILY
Status: DISCONTINUED | OUTPATIENT
Start: 2018-08-24 | End: 2018-08-25 | Stop reason: HOSPADM

## 2018-08-24 RX ORDER — ATORVASTATIN CALCIUM 80 MG/1
80 TABLET, FILM COATED ORAL
Status: DISCONTINUED | OUTPATIENT
Start: 2018-08-24 | End: 2018-08-25 | Stop reason: HOSPADM

## 2018-08-24 RX ORDER — FENTANYL CITRATE 50 UG/ML
INJECTION, SOLUTION INTRAMUSCULAR; INTRAVENOUS CODE/TRAUMA/SEDATION MEDICATION
Status: COMPLETED | OUTPATIENT
Start: 2018-08-24 | End: 2018-08-24

## 2018-08-24 RX ADMIN — NITROGLYCERIN 200 MCG: 20 INJECTION INTRAVENOUS at 10:39

## 2018-08-24 RX ADMIN — MIDAZOLAM 1 MG: 1 INJECTION INTRAMUSCULAR; INTRAVENOUS at 10:38

## 2018-08-24 RX ADMIN — FENTANYL CITRATE 50 MCG: 50 INJECTION, SOLUTION INTRAMUSCULAR; INTRAVENOUS at 10:24

## 2018-08-24 RX ADMIN — IOHEXOL 100 ML: 350 INJECTION, SOLUTION INTRAVENOUS at 10:52

## 2018-08-24 RX ADMIN — HEPARIN SODIUM 4000 UNITS: 1000 INJECTION INTRAVENOUS; SUBCUTANEOUS at 10:26

## 2018-08-24 RX ADMIN — VERAPAMIL HYDROCHLORIDE 2.5 MG: 2.5 INJECTION, SOLUTION INTRAVENOUS at 10:26

## 2018-08-24 RX ADMIN — MIDAZOLAM 2 MG: 1 INJECTION INTRAMUSCULAR; INTRAVENOUS at 10:25

## 2018-08-24 RX ADMIN — SODIUM CHLORIDE 125 ML/HR: 0.9 INJECTION, SOLUTION INTRAVENOUS at 08:47

## 2018-08-24 RX ADMIN — FENTANYL CITRATE 50 MCG: 50 INJECTION, SOLUTION INTRAMUSCULAR; INTRAVENOUS at 10:38

## 2018-08-24 RX ADMIN — NITROGLYCERIN 200 MCG: 20 INJECTION INTRAVENOUS at 10:26

## 2018-08-24 RX ADMIN — LIDOCAINE HYDROCHLORIDE 1 ML: 10 INJECTION, SOLUTION INFILTRATION; PERINEURAL at 10:25

## 2018-08-24 NOTE — H&P
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/o cad and prior anterior MI  Recently went 15 5 minutes on treadmill and had normal nuclear stress test        Review of Systems:  Review of Systems    14 systems reviewed and negative with the exception of the above       No current facility-administered medications for this encounter  Historical Information   Past Medical History:   Diagnosis Date    Hyperlipidemia     Myocardial infarction [facility])     cardiac stent     Past Surgical History:   Procedure Laterality Date    HIP SURGERY  2011    Trochanteric fracture requiring nail, stress fx     WISDOM TOOTH EXTRACTION       History   Alcohol Use    Yes     Comment: Social alcohol use     History   Drug Use No     History   Smoking Status    Never Smoker   Smokeless Tobacco    Never Used     Comment: No tobacco/smoke exposure     Family History:   Family History   Problem Relation Age of Onset    Cancer Mother     Hypertension Mother     Coronary artery disease Father     Hyperlipidemia Father     Hypertension Brother     Colon cancer Neg Hx        Meds/Allergies         No Known Allergies    Objective   Vitals: Blood pressure 127/85, pulse (!) 48, temperature 97 7 °F (36 5 °C), temperature source Tympanic, resp  rate 18, height 5' 10" (1 778 m), weight 77 1 kg (170 lb), SpO2 97 %  , Body mass index is 24 39 kg/m² ,   Orthostatic Blood Pressures      Most Recent Value   Blood Pressure  127/85 filed at 08/24/2018 0830          No intake or output data in the 24 hours ending 08/24/18 1017    Invasive Devices     Peripheral Intravenous Line            Peripheral IV 08/24/18 Left Wrist less than 1 day                    Physical Exam:  Physical Exam    Gen: No acute distress  HEENT: anicteric, mucous membranes moist  Neck: supple, no jugular venous distention, or carotid bruit  Heart: regular, normal s1 and s2, no murmur/rub or gallop  Lungs :clear to auscultation bilaterally, no rales/rhonchi or wheeze  Abdomen: soft nontender, normoactive bowel sounds, no organomegaly  Ext: warm and perfused, normal femoral pulses, no edema, clubbing  Skin: warm, no rashes  Neuro: AAO x 3, no focal findings  Psychiatric: normal affect  Musculoskeletal: no obvious joint deformities      Lab Results:       Results from last 7 days  Lab Units 08/24/18  0845   SODIUM mmol/L 138   POTASSIUM mmol/L 4 4   CHLORIDE mmol/L 103   CO2 mmol/L 26   BUN mg/dL 17   CREATININE mg/dL 0 90   GLUCOSE RANDOM mg/dL 98   CALCIUM mg/dL 9 0               Results from last 7 days  Lab Units 08/24/18  0845   WBC Thousand/uL 5 61   HEMOGLOBIN g/dL 15 6   HEMATOCRIT % 45 9   PLATELETS Thousands/uL 233       No results found for: CHOL  Lab Results   Component Value Date    HDL 44 07/06/2018     Lab Results   Component Value Date    LDLCALC 50 07/06/2018     Lab Results   Component Value Date    TRIG 90 07/06/2018       Lab Results   Component Value Date    ALT 62 07/06/2018    AST 31 07/06/2018             No results found for: NTBNP    No results found for: HGBA1C        Imaging: I have personally reviewed pertinent films in PACS

## 2018-08-24 NOTE — PROGRESS NOTES
hemoband removed, no bleeding or hematoma noted  Hemostasis achieved, gauze pad and tegaderm X 2 placed

## 2018-08-24 NOTE — DISCHARGE INSTRUCTIONS
Left Heart Catheterization   WHAT YOU NEED TO KNOW:   A left heart catheterization is a procedure to look at your heart and its arteries  You may need this procedure if you have chest pain, heart disease, or your heart is not working as it should  DISCHARGE INSTRUCTIONS:   Follow up with your healthcare provider as directed:  Write down your questions so you remember to ask them during your visits  Limit activity as directed:   · Avoid unnecessary stair climbing for 48 hours, if a catheter was put in your groin  · Do not place pressure on your arm, hand, or wrist, if the catheter was placed in your wrist  Avoid pushing, pulling, or heavy lifting with that arm  · If you need to cough, support the area where the catheter was inserted with your hand  · Ask your healthcare provider how long you need to limit movement and avoid certain activities  · You may feel like resting more after your procedure  Slowly start to do more each day  Rest when you feel it is needed  Drink liquids as directed:  Liquids help flush the dye used for your procedure out of your body  Ask your healthcare provider how much liquid to drink each day, and which liquids to drink  Some foods, such as soup and fruit, also provide liquid  Wound care:  Ask your healthcare provider about how to care for your incision wound  Ask when you can get into a tub, shower, or pool  Contact your healthcare provider if:   · You have a fever  · The skin around your wound is red, swollen, or has pus coming from it  · You have trouble breathing, or your skin is itchy, swollen, or has a rash  · You have questions or concerns about your condition or care  Seek care immediately or call 911 if:   · The area where the catheter was placed is swollen and filled with blood or is bleeding  · The leg or arm used for the procedure becomes numb or turns white or blue  · You feel lightheaded, short of breath, and have chest pain  · You cough up blood  · You have any of the following signs of a heart attack:      ¨ Squeezing, pressure, or pain in your chest that lasts longer than 5 minutes or returns    ¨ Discomfort or pain in your back, neck, jaw, stomach, or arm     ¨ Trouble breathing    ¨ Nausea or vomiting    ¨ Lightheadedness or a sudden cold sweat, especially with chest pain or trouble breathing    · Your arm or leg feels warm, tender, and painful  It may look swollen and red  · You have any of the following signs of a stroke:     ¨ Part of your face droops or is numb    ¨ Weakness in an arm or leg    ¨ Confusion or difficulty speaking    ¨ Dizziness, a severe headache, or vision loss  © 2017 [address] [facility] Information is for End User's use only and may not be sold, redistributed or otherwise used for commercial purposes  All illustrations and images included in CareNotes® are the copyrighted property of A D A M , Inc  or Reddy Sánchez  The above information is an  only  It is not intended as medical advice for individual conditions or treatments  Talk to your doctor, nurse or pharmacist before following any medical regimen to see if it is safe and effective for you

## 2018-08-24 NOTE — PROGRESS NOTES
GEOFF Luther, to complete med rec for pt  Also, there is an order for ASA, Brilinta, lipitor, coEnzyme Q, Zestril, Toprol XL to be given at 1100 today  Pt has taken these meds this morning  Per GEOFF Case do not give these today, pt to resume tomorrow and can be marked off MAR as not given

## 2018-08-27 ENCOUNTER — APPOINTMENT (OUTPATIENT)
Dept: CARDIAC REHAB | Facility: HOSPITAL | Age: 53
End: 2018-08-27
Payer: COMMERCIAL

## 2018-08-27 ENCOUNTER — CLINICAL SUPPORT (OUTPATIENT)
Dept: CARDIAC REHAB | Facility: HOSPITAL | Age: 53
End: 2018-08-27
Payer: COMMERCIAL

## 2018-08-27 DIAGNOSIS — I21.3 ST ELEVATION MYOCARDIAL INFARCTION (STEMI), UNSPECIFIED ARTERY (HCC): ICD-10-CM

## 2018-08-27 DIAGNOSIS — Z95.5 S/P CORONARY ARTERY STENT PLACEMENT: ICD-10-CM

## 2018-08-27 PROCEDURE — 93798 PHYS/QHP OP CAR RHAB W/ECG: CPT

## 2018-08-29 ENCOUNTER — APPOINTMENT (OUTPATIENT)
Dept: CARDIAC REHAB | Facility: HOSPITAL | Age: 53
End: 2018-08-29
Payer: COMMERCIAL

## 2018-08-29 ENCOUNTER — CLINICAL SUPPORT (OUTPATIENT)
Dept: CARDIAC REHAB | Facility: HOSPITAL | Age: 53
End: 2018-08-29
Payer: COMMERCIAL

## 2018-08-29 DIAGNOSIS — I21.3 ST ELEVATION MYOCARDIAL INFARCTION (STEMI), UNSPECIFIED ARTERY (HCC): ICD-10-CM

## 2018-08-29 DIAGNOSIS — Z95.5 S/P CORONARY ARTERY STENT PLACEMENT: ICD-10-CM

## 2018-08-29 PROCEDURE — 93798 PHYS/QHP OP CAR RHAB W/ECG: CPT

## 2018-08-29 NOTE — PROGRESS NOTES
Cardiac Rehabilitation Plan of Care   90 Day Report      Today's date: 2018  Visits: 26  Patient name: Neela Terry      : 1965  Age: 48 y o  MRN: [id number]  Referring Physician: Rebeka Williamson DO  Provider: [facility]  Clinician: Micheal Joe MS    Dx:   Encounter Diagnoses   Name Primary?  ST elevation myocardial infarction (STEMI), unspecified artery ([address] )     S/P coronary artery stent placement      Date of onset: 2018    Comments: Mr Ata Prado has attended 26 sessions of his CII program  He has returned to running, biking, and swimming  He has started jogging 25-30 min in rehab and is doing well keeping HR around 130-145 bpm  He has started some biking on his own at home  Will continue to progress exercise as tolerated by patient  He reports completing all testing needed to return to flying, and hopes to return to work as soon as possible  Medication compliance: Yes   Comments: taking medications as prescribed  Fall Risk: No   Comments: no falls to date    EXERCISE/ACTIVITY    Cardiovascular:   Min: 40-50 min   METS: 4 0-6 4   Hr: 101-141   RPE: 11-13   O2 sat: n/a    Modalities: Treadmill, UBE, Lifecycle, Eliptical , Rower and Recumbent bike  Strength trainin-3 days / week, 12-15 repitations  and 1-2 sets per modality    Modalities: Chest press, Lateral pull down , Lateral raise and Arm curl, arm extension- to incorporate at 3-4 weeks r/t soreness in chest from CPR  EKG changes: none  Dyspnea score: n/a  Home activity: Patient has started jogging, walking, and swimming on his own and feeling confident with exercise on his own  He plans to continue adding days and time on to exercise as tolerated  Goals: Start exercising 3x/week at CII for 30 min and incorporate home exercise (running, biking, swimming) 2-3x /week as tolerated within first 30 days of CII    Education: THR/RPE, S/S overexertion, exercise principles, home exercise  Plan: Start CII 3x/week attending regularly and start home exercise 2-3x/week as tolerated  Educate patient on self monitoring and safely exercising  Readiness to change: Action    NUTRITION    Weight control:    Starting weight: 164 lbs   Current weight:   170 lbs  Waist circumference:    Startin   Current:    Diabetes: N/A  Lipid management: patient has history of high cholesterol and family history r/t cholesterol  Labs prior to event LDL: 182, Tri, HDL: 48  2018 LDL: 84, Tri  To have lipids drawn again in 6 weeks  Goals: Manage lipids within normal guidelines with medication, diet, and exercise  Education: lipid management, Diet, label reading, DASH  Plan: Return to regular exercise and follow low fat/chol diet to help manage lipids  Patient education on lipid management  Readiness to change: action    PSYCHOSOCIAL    Emotional: PHQ-9=8- reports sometimes feeling down or anxiety  High score due to not sleeping well and tiredness  Self-reported stress level: 1 - minimal stress right now  Social support: Reports good support from his wife who is a dietician-helping to follow healthy diet  Friends have been supportive since MI    Goals: Continue using stress management and relaxation techniques to positively manage stress  Education: Stress management, relaxation techniques  Plan: Resume regular exercise to use as relaxation technique to manage stress as needed  Readiness to change: action    OTHER CORE COMPONENTS     Tobacco:   History   Smoking Status    Never Smoker   Smokeless Tobacco    Never Used     Comment: No tobacco/smoke exposure     Blood pressure:    Restin/70   Exercise:  132/72  Goals: n/a  Education: n/a  Plan: n/a  Readiness to change: Maintenance

## 2018-08-30 ENCOUNTER — APPOINTMENT (OUTPATIENT)
Dept: LAB | Facility: MEDICAL CENTER | Age: 53
End: 2018-08-30
Payer: COMMERCIAL

## 2018-08-30 LAB
ANION GAP SERPL CALCULATED.3IONS-SCNC: 8 MMOL/L (ref 4–13)
BUN SERPL-MCNC: 13 MG/DL (ref 5–25)
CALCIUM SERPL-MCNC: 8.7 MG/DL (ref 8.3–10.1)
CHLORIDE SERPL-SCNC: 100 MMOL/L (ref 100–108)
CHOLEST SERPL-MCNC: 120 MG/DL (ref 50–200)
CO2 SERPL-SCNC: 26 MMOL/L (ref 21–32)
CREAT SERPL-MCNC: 0.92 MG/DL (ref 0.6–1.3)
GFR SERPL CREATININE-BSD FRML MDRD: 96 ML/MIN/1.73SQ M
GLUCOSE P FAST SERPL-MCNC: 90 MG/DL (ref 65–99)
HDLC SERPL-MCNC: 45 MG/DL (ref 40–60)
LDLC SERPL CALC-MCNC: 61 MG/DL (ref 0–100)
NONHDLC SERPL-MCNC: 75 MG/DL
POTASSIUM SERPL-SCNC: 4 MMOL/L (ref 3.5–5.3)
SODIUM SERPL-SCNC: 134 MMOL/L (ref 136–145)
TRIGL SERPL-MCNC: 69 MG/DL

## 2018-08-30 PROCEDURE — 36415 COLL VENOUS BLD VENIPUNCTURE: CPT

## 2018-08-30 PROCEDURE — 80061 LIPID PANEL: CPT

## 2018-08-30 PROCEDURE — 80048 BASIC METABOLIC PNL TOTAL CA: CPT

## 2018-08-31 ENCOUNTER — APPOINTMENT (OUTPATIENT)
Dept: CARDIAC REHAB | Facility: HOSPITAL | Age: 53
End: 2018-08-31
Payer: COMMERCIAL

## 2018-09-05 ENCOUNTER — APPOINTMENT (OUTPATIENT)
Dept: CARDIAC REHAB | Facility: HOSPITAL | Age: 53
End: 2018-09-05
Payer: COMMERCIAL

## 2018-09-05 ENCOUNTER — CLINICAL SUPPORT (OUTPATIENT)
Dept: CARDIAC REHAB | Facility: HOSPITAL | Age: 53
End: 2018-09-05
Payer: COMMERCIAL

## 2018-09-05 ENCOUNTER — OFFICE VISIT (OUTPATIENT)
Dept: CARDIOLOGY CLINIC | Facility: CLINIC | Age: 53
End: 2018-09-05
Payer: COMMERCIAL

## 2018-09-05 VITALS
HEIGHT: 70 IN | SYSTOLIC BLOOD PRESSURE: 110 MMHG | DIASTOLIC BLOOD PRESSURE: 70 MMHG | HEART RATE: 68 BPM | BODY MASS INDEX: 24.77 KG/M2 | WEIGHT: 173 LBS

## 2018-09-05 DIAGNOSIS — Z95.5 S/P CORONARY ARTERY STENT PLACEMENT: ICD-10-CM

## 2018-09-05 DIAGNOSIS — I25.10 CORONARY ARTERY DISEASE INVOLVING NATIVE CORONARY ARTERY OF NATIVE HEART WITHOUT ANGINA PECTORIS: Primary | ICD-10-CM

## 2018-09-05 DIAGNOSIS — I21.3 ST ELEVATION MYOCARDIAL INFARCTION (STEMI), UNSPECIFIED ARTERY (HCC): ICD-10-CM

## 2018-09-05 DIAGNOSIS — E78.5 DYSLIPIDEMIA: ICD-10-CM

## 2018-09-05 PROCEDURE — 93798 PHYS/QHP OP CAR RHAB W/ECG: CPT

## 2018-09-05 PROCEDURE — 99214 OFFICE O/P EST MOD 30 MIN: CPT | Performed by: INTERNAL MEDICINE

## 2018-09-05 NOTE — PROGRESS NOTES
Cardiology Follow up        Kit Prime      1965      [id number]        Discussion/Summary:    1  Anterior wall ST-elevation myocardial infarction 7/51/7394, complicated by VF arrest, status post defibrillation x3, therapeutic hypothermia, s/p PCI/KASSANDRA to proximal LAD  2  CAD with residual 45% nonobstructive left circumflex disease (not hemodynamically significant by iFR and FFR testing as well as by nuclear stress testing)  3   Dyslipidemia, high suspicion of heterozygous familial hypercholesterolemia (LDL in 2015 up to 182 mg/dL in the setting of regular exercise and extremely healthy lifestyle, father with myocardial infarction at age 46)    · From a symptomatic standpoint this patient is doing very well  Will continue dual anti-platelet treatment for at least 1 year, and possibly indefinitely if he continues to do well  Continue lisinopril and metoprolol at current doses  Higher doses have caused lightheadedness  Patient is fully compliant with medications, follow-up, and lifestyle modification instructions  · Echocardiogram with normal LVEF ~ 60-65% without regional WMA  No valvular disease  · Repeat coronary angiography 08/24/2018 with patent proximal LAD stent and functionally insignificant 45% nonobstructive left circumflex stenosis  · Lipid panel (LDL and nonHDL) at goal   Continue high dose statin  · Blood pressure at goal, continue lisinopril and metoprolol  · Weight at goal  · Patient highly motivated to (and actively engaged in) minimizing cardiovascular risk factors  · Continue cardiac rehabilitation  · PriorECG with nonspecific ST and T-wave abnormality anteriorly, likely secondary to prior infarction      F/U in 6 months        Interval History: This is a very pleasant 37-year-old male with a history of dyslipidemia and family history of premature CAD with father having myocardial infarction at age 46    He is a very health conscious   To review his case, on 05/20/2018 several hours into a triathlon, while running, he experienced a cardiac arrest which was witnessed  Fortunately, an AED was applied and he received 3 shocks and subsequent CPR  He presented to [facility] at which time ECG revealed anterior ST elevations  Emergent cardiac catheterization revealed 100% occlusion of proximal LAD and he thereafter underwent PCI/KASSANDRA with 3 5 x 23 mm stent to the proximal LAD  He had 10% mid RCA disease and reported 80% mid circumflex disease  He underwent therapeutic hypothermia with subsequent mild cognitive decline and transient memory loss, which has subsequently resolved  Echocardiogram 05/23/2018 revealed slight improvement in left ventricular ejection fraction at 50%  He was been maintained on aspirin, Brilinta, high-dose atorvastatin, lisinopril 10 mg daily, metoprolol tartrate 25 mg twice daily  Metoprolol was subsequently reduced to once daily succinate formula in light of intermittent lightheadedness, and lisinopril has been reduced to 5 mg daily for the same  Subsequently, he has started cardiac rehabilitation  Initially, he had significant chest wall pain likely secondary to CPR which has resolved  He underwent repeat coronary angiography to address the left circumflex lesion on 06/07/2018  However this was only noted to be 20% stenotic with a widely patent LAD stent, and only luminal irregularities of the mid LAD  RCA was not re-imaged  Review of prior blood work 12/01/2015 revealed very high LDL at 182 mg/dL, elevated triglycerides at 167 mg/dL, HDL of 48  Atorvastatin was optimized 80 mg daily and repeat lipid panel 7/6/18 revealed LDL at 50 mg/dL, nonHDL 68 mg/dL, TG 90 mg/dL, HDL 44 mg/dL  Subsequent lipid panel 08/30/2018 revealed LDL 61 mg/dL, HDL 45 mg/dL, triglycerides 69 mg/dL, total cholesterol 120 mg/dL    Prior fasting blood glucose on 08/30/2018 was normal at 90 mg/dL with normal CBC  To follow [address] [place] [address] Dr wing for going back to work, he had repeat coronary angiography on 08/24/2018 revealing 0% stenosis at the site of the prior proximal LAD stent, and 45% mid circumflex stenosis after the 1st obtuse marginal branch (39-45% by QCA analysis)  iFR and FFR were both not hemodynamically significant, measuring 1 0 and 0 85, respectively  His distal RCA had 20% stenosis  Ejection fraction by left ventriculography was 65%  He performed excellent on his exercise nuclear stress test on 08/22/2018  Duration of exercise was 15 min and 31 sec, achieving 17 9 Mets, reaching stage 6 of the Min protocol  He achieved 92% maximal predicted heart rate while on beta blockade, with normal heart rate response and blood pressure response to exercise  His peak oxygen saturation was 100% with no ECG or SPECT evidence of myocardial ischemia, or prior infarction  Calculated left ventricular ejection fraction was 57%  Mr Jasen May presents today for follow-up with no new complaints  He is doing very well at cardiac rehabilitation  He denies any exertional chest discomfort, shortness of breath, dizziness, palpitations, lower extremity edema, claudication          Vitals:  Vitals:    09/05/18 1409   BP: 110/70   BP Location: Right arm   Patient Position: Sitting   Cuff Size: Standard   Pulse: 68   Weight: 78 5 kg (173 lb)   Height: 5' 10" (1 778 m)         Past Medical History:   Diagnosis Date    Hyperlipidemia     Myocardial infarction (HCC)     cardiac stent     Social History     Social History    Marital status: /Civil Union     Spouse name: N/A    Number of children: N/A    Years of education: N/A     Occupational History     [employer]     Full-time     Social History Main Topics    Smoking status: Never Smoker    Smokeless tobacco: Never Used      Comment: No tobacco/smoke exposure    Alcohol use Yes      Comment: Social alcohol use    Drug use: No    Sexual activity: Not on file     Other Topics Concern    Not on file     Social History Narrative    Exercises regularly      Family History   Problem Relation Age of Onset   Sendy Rashi Cancer Mother     Hypertension Mother     Coronary artery disease Father     Hyperlipidemia Father     Hypertension Brother     Colon cancer Neg Hx      Past Surgical History:   Procedure Laterality Date    HIP SURGERY  2011    Trochanteric fracture requiring nail, stress fx     WISDOM TOOTH EXTRACTION         Current Outpatient Prescriptions:     aspirin 81 mg chewable tablet, Chew 81 mg daily  , Disp: , Rfl:     atorvastatin (LIPITOR) 80 mg tablet, Take 1 tablet (80 mg total) by mouth daily, Disp: 90 tablet, Rfl: 1    Cholecalciferol (VITAMIN D PO), Take 50 Units by mouth 2 (two) times a day  , Disp: , Rfl:     Coenzyme Q10 200 MG capsule, Take 200 mg by mouth daily, Disp: , Rfl:     lisinopril (ZESTRIL) 5 mg tablet, Take 1 tablet (5 mg total) by mouth daily for 120 days, Disp: 90 tablet, Rfl: 5    metoprolol succinate (TOPROL-XL) 25 mg 24 hr tablet, Take 1 tablet (25 mg total) by mouth daily, Disp: 90 tablet, Rfl: 1    ticagrelor (BRILINTA) 90 MG, Take 90 mg by mouth every 12 (twelve) hours, Disp: , Rfl:         Review of Systems:  Review of Systems   Respiratory: Negative  Cardiovascular: Negative  Neurological: Negative for light-headedness  All other systems reviewed and are negative  Physical Exam:  Physical Exam   Constitutional: He is oriented to person, place, and time  He appears well-developed and well-nourished  No distress  HENT:   Head: Normocephalic and atraumatic  Eyes: EOM are normal  Pupils are equal, round, and reactive to light  Right eye exhibits no discharge  No scleral icterus  Neck: Normal range of motion  Neck supple  No thyromegaly present  Cardiovascular: Normal rate, regular rhythm and normal heart sounds  Exam reveals no gallop and no friction rub      No murmur heard   Pulmonary/Chest: Effort normal and breath sounds normal    Abdominal: He exhibits no distension  There is no tenderness  There is no rebound and no guarding  Musculoskeletal: Normal range of motion  He exhibits no edema  Neurological: He is alert and oriented to person, place, and time  Coordination normal    Skin: Skin is warm and dry  No rash noted  He is not diaphoretic  No erythema  No pallor  Psychiatric: He has a normal mood and affect   His behavior is normal  Judgment and thought content normal

## 2018-09-07 ENCOUNTER — APPOINTMENT (OUTPATIENT)
Dept: CARDIAC REHAB | Facility: HOSPITAL | Age: 53
End: 2018-09-07
Payer: COMMERCIAL

## 2018-09-10 ENCOUNTER — CLINICAL SUPPORT (OUTPATIENT)
Dept: CARDIAC REHAB | Facility: HOSPITAL | Age: 53
End: 2018-09-10
Payer: COMMERCIAL

## 2018-09-10 ENCOUNTER — APPOINTMENT (OUTPATIENT)
Dept: CARDIAC REHAB | Facility: HOSPITAL | Age: 53
End: 2018-09-10
Payer: COMMERCIAL

## 2018-09-10 VITALS — WEIGHT: 170 LBS | BODY MASS INDEX: 24.34 KG/M2 | HEIGHT: 70 IN

## 2018-09-10 DIAGNOSIS — Z95.5 S/P CORONARY ARTERY STENT PLACEMENT: ICD-10-CM

## 2018-09-10 DIAGNOSIS — I21.3 ST ELEVATION MYOCARDIAL INFARCTION (STEMI), UNSPECIFIED ARTERY (HCC): ICD-10-CM

## 2018-09-10 PROCEDURE — 93798 PHYS/QHP OP CAR RHAB W/ECG: CPT

## 2018-09-10 NOTE — PROGRESS NOTES
Cardiac Rehabilitation Plan of Care   Final Report      Today's date: 2018  Visits: 28  Patient name: Marko Martins      : 1965  Age: 48 y o  MRN: [id number]  Referring Physician: Fiordaliza Adams DO  Provider: [address] [place] [address] Cardiopulmonary Rehabilitation  Clinician: Yesenia Mondragon MS    Dx:   No diagnosis found  Date of onset: 2018    Comments: Mr Yamini Hurtado has completed his CII program at 29 sessions  He has returned to running, biking, and swimming  He has been jogging 25-30 min in rehab and is doing well keeping HR around 130-145 bpm  He has started some biking on his own at home  He reports completing all testing needed to return to flying, and hopes to return to work as soon as possible  He plans to join a gym to continue regular exercise  Medication compliance: Yes   Comments: taking medications as prescribed  Fall Risk: No   Comments: no falls to date    EXERCISE/ACTIVITY    Cardiovascular:   Min: 40-50 min   METS: 4 0-6 4   Hr: 101-141   RPE: 11-13   O2 sat: n/a    Modalities: Treadmill, UBE, Lifecycle, Eliptical , Rower and Recumbent bike  Strength trainin-3 days / week, 12-15 repitations  and 1-2 sets per modality    Modalities: Chest press, Lateral pull down , Lateral raise and Arm curl, arm extension- to incorporate at 3-4 weeks r/t soreness in chest from CPR  EKG changes: none  Dyspnea score: n/a  Home activity: Patient has started jogging, walking, and swimming on his own and feeling confident with exercise on his own  He plans to continue adding days and time on to exercise as tolerated  Goals: Start exercising 3x/week at CII for 30 min and incorporate home exercise (running, biking, swimming) 2-3x /week as tolerated within first 30 days of CII  Education: THR/RPE, S/S overexertion, exercise principles, home exercise  Plan: Start CII 3x/week attending regularly and start home exercise 2-3x/week as tolerated   Educate patient on self monitoring and safely exercising  Readiness to change: Action    NUTRITION    Weight control:    Starting weight: 164 lbs   Current weight: Weight - Scale: (P) 77 1 kg (170 lb) 170 lbs  Waist circumference:    Startin   Current: Waist circumference (inches): (P) 34 Inches  Diabetes: N/A  Lipid management: patient has history of high cholesterol and family history r/t cholesterol  Labs prior to event LDL: 182, Tri, HDL: 48  2018 LDL: 84, Tri  To have lipids drawn again in 6 weeks  Goals: Manage lipids within normal guidelines with medication, diet, and exercise  Education: lipid management, Diet, label reading, DASH  Plan: Return to regular exercise and follow low fat/chol diet to help manage lipids  Patient education on lipid management  Readiness to change: action    PSYCHOSOCIAL    Emotional: PHQ-9=8- reports sometimes feeling down or anxiety  High score due to not sleeping well and tiredness  Self-reported stress level: 1 - minimal stress right now  Social support: Reports good support from his wife who is a dietician-helping to follow healthy diet  Friends have been supportive since MI    Goals: Continue using stress management and relaxation techniques to positively manage stress  Education: Stress management, relaxation techniques  Plan: Resume regular exercise to use as relaxation technique to manage stress as needed  Readiness to change: action    OTHER CORE COMPONENTS     Tobacco:   History   Smoking Status    Never Smoker   Smokeless Tobacco    Never Used     Comment: No tobacco/smoke exposure     Blood pressure:    Restin/70   Exercise:  132/72  Goals: n/a  Education: n/a  Plan: n/a  Readiness to change: Maintenance

## 2018-09-10 NOTE — PROGRESS NOTES
OUTCOMES- POST    Name: Lori Gillespie : 1965 Dx: MI, PCI     Risk:      LOW/ MOD/ HIGH        Pre Post % change  Goal   Date: 2018 9/10/2018     Physical           Sub Max ETT (mets) 7 5 9 6 28 0% 10% increase   6MWT (feet) n/a n/a #VALUE! 10% increase   UCSD Dyspnea Score n/a n/a #VALUE! 5 pt decrease   Supplemental O2 use (L) n/a n/a     [facility] AI (est  peak O2) 32 2 58 2 80 7%    Peak exercise CR/ CA (mets) 5 6 4 28 0% 40% increase   Questionnaires           PHQ 9  (> 10 refer to MD) 8 1 -87 5% 4 pt decrease   [facility] lower score = improvement     Total  19 13 -31 6% < 27   Feelings 2 2 0 0% < 3   Physical fitness 4 1 -75 0% < 3   Social Support 1 2 100 0% < 3   Daily activities 1 1 0 0% < 3   Social Activities 1 1 0 0% < 3   Pain 2 1 -50 0% < 3   Overall Health 3 2 -33 3% < 3   Quality of Life 2 2 0 0% < 3   Change in health 33 1 -97 0% < 3   Rate your plate 72 56 -13 1% > 58   Measurements           Weight 164 170 3 7% 2 5-5%    BMI 23 5 24 4 3 8% 19-25   Waist Circ  32 34 6 3% < 40 M / < 35 F   % Body fat 22 4 19 5 -12 9% < 25 M / < 33 F    BP left arm     (systolic) 742 849 1 8% < 114                              (diastolic) 70 68 -3 2% < 90   Smoking #/day (if applicable) 0 0 #DIV/0! 0   Total cholesterol  120 #DIV/0!    Triglycerides 69 69 0 0% < 150   HDL 48 45 -6 3% 40-60   LDL 84 61 -27 4% < 100   A1C % not available n/a #VALUE! 4 0 - 5 6%   Fasting BG not available 90 #VALUE!

## 2018-09-12 ENCOUNTER — APPOINTMENT (OUTPATIENT)
Dept: CARDIAC REHAB | Facility: HOSPITAL | Age: 53
End: 2018-09-12
Payer: COMMERCIAL

## 2018-09-14 ENCOUNTER — APPOINTMENT (OUTPATIENT)
Dept: CARDIAC REHAB | Facility: HOSPITAL | Age: 53
End: 2018-09-14
Payer: COMMERCIAL

## 2018-09-17 ENCOUNTER — APPOINTMENT (OUTPATIENT)
Dept: CARDIAC REHAB | Facility: HOSPITAL | Age: 53
End: 2018-09-17
Payer: COMMERCIAL

## 2018-09-19 ENCOUNTER — APPOINTMENT (OUTPATIENT)
Dept: CARDIAC REHAB | Facility: HOSPITAL | Age: 53
End: 2018-09-19
Payer: COMMERCIAL

## 2018-09-21 ENCOUNTER — APPOINTMENT (OUTPATIENT)
Dept: CARDIAC REHAB | Facility: HOSPITAL | Age: 53
End: 2018-09-21
Payer: COMMERCIAL

## 2018-10-25 ENCOUNTER — OFFICE VISIT (OUTPATIENT)
Dept: FAMILY MEDICINE CLINIC | Facility: CLINIC | Age: 53
End: 2018-10-25

## 2018-10-25 VITALS
HEIGHT: 69 IN | BODY MASS INDEX: 26.07 KG/M2 | WEIGHT: 176 LBS | SYSTOLIC BLOOD PRESSURE: 110 MMHG | DIASTOLIC BLOOD PRESSURE: 70 MMHG | HEART RATE: 57 BPM

## 2018-10-25 DIAGNOSIS — Z02.89 ENCOUNTER FOR FEDERAL AVIATION ADMINISTRATION (FAA) EXAMINATION: Primary | ICD-10-CM

## 2018-10-25 PROCEDURE — 99499 UNLISTED E&M SERVICE: CPT | Performed by: FAMILY MEDICINE

## 2018-10-25 NOTE — PROGRESS NOTES
Chief Complaint   Patient presents with    Physical Exam     1st Class FAA PE No EKG W/Readers Conf Number [id number]  Pt reports NO SODA and No Spec Issuance Letters at the time; however, patient is reporting a + New MI (05/2018) and + Cardiac STENT LAD (05/2018), HTN, and Hyperlipidemia

## 2018-11-14 ENCOUNTER — OFFICE VISIT (OUTPATIENT)
Dept: CARDIOLOGY CLINIC | Facility: CLINIC | Age: 53
End: 2018-11-14
Payer: COMMERCIAL

## 2018-11-14 VITALS
DIASTOLIC BLOOD PRESSURE: 62 MMHG | HEART RATE: 68 BPM | HEIGHT: 70 IN | SYSTOLIC BLOOD PRESSURE: 110 MMHG | WEIGHT: 178.4 LBS | BODY MASS INDEX: 25.54 KG/M2

## 2018-11-14 DIAGNOSIS — I25.10 CORONARY ARTERY DISEASE INVOLVING NATIVE CORONARY ARTERY OF NATIVE HEART WITHOUT ANGINA PECTORIS: Primary | ICD-10-CM

## 2018-11-14 DIAGNOSIS — E78.5 DYSLIPIDEMIA: ICD-10-CM

## 2018-11-14 PROCEDURE — 99214 OFFICE O/P EST MOD 30 MIN: CPT | Performed by: INTERNAL MEDICINE

## 2018-11-14 NOTE — PROGRESS NOTES
Cardiology Follow up        Miri Enriquez      1965      [id number]        Discussion/Summary:    1  Anterior wall ST-elevation myocardial infarction 9/65/3994, complicated by VF arrest, status post defibrillation x3, therapeutic hypothermia, s/p PCI/KASSANDRA to proximal LAD  2  CAD with residual 45% nonobstructive left circumflex disease (not hemodynamically significant by iFR and FFR testing as well as by nuclear stress testing)  3   Dyslipidemia, high suspicion of heterozygous familial hypercholesterolemia (LDL in 2015 up to 182 mg/dL in the setting of regular exercise and extremely healthy lifestyle, father with myocardial infarction at age 46)    · No symptoms of angina, however he reports some level of shortness of breath at rest, in a sensation of feeling somewhat claustrophobic or perhaps a sensation of difficulty getting air in at rest and with exercise  He states holding lisinopril and metoprolol has improved this although he denies any dizziness  For now, will discontinue lisinopril and metoprolol to see if his symptoms resolve  If not, this sensation of dyspnea may be secondary to his Brilinta  He will call me in 1-2 weeks and report if he has persistent symptoms in which case Brilinta may be changed to Effient  · Blood pressure and heart rate well controlled, lipids very well controlled  · Continue high-dose statin which is tolerating well  · Echocardiogram with normal LVEF ~ 60-65% without regional WMA    No valvular disease  · Repeat coronary angiography 08/24/2018 with patent proximal LAD stent and functionally insignificant 45% nonobstructive left circumflex stenosis  · Continue cardiac rehabilitation  · PriorECG with nonspecific ST and T-wave abnormality anteriorly, likely secondary to prior infarction  · Had very long discussion with patient and his wife regarding the pathophysiology of coronary disease, acute plaque rupture and acute coronary syndrome, as well as his overall good prognosis in the future  There is certainly a psychological residual component which she feels when he starts to exercise and occasionally at rest as well  His wife does mention that he has been depressed during this past month, but overall doing better  F/U in 4 months as scheduled        Interval History: This is a very pleasant 70-year-old male with a history of dyslipidemia and family history of premature CAD with father having myocardial infarction at age 46  He is a very health conscious   To review his case, on 05/20/2018 several hours into a triathlon, while running, he experienced a cardiac arrest which was witnessed  Fortunately, an AED was applied and he received 3 shocks and subsequent CPR  He presented to [facility] at which time ECG revealed anterior ST elevations  Emergent cardiac catheterization revealed 100% occlusion of proximal LAD and he thereafter underwent PCI/KASSANDRA with 3 5 x 23 mm stent to the proximal LAD  He had 10% mid RCA disease and reported 80% mid circumflex disease  He underwent therapeutic hypothermia with subsequent mild cognitive decline and transient memory loss, which has subsequently resolved  Echocardiogram 05/23/2018 revealed slight improvement in left ventricular ejection fraction at 50%  He was been maintained on aspirin, Brilinta, high-dose atorvastatin, lisinopril 10 mg daily, metoprolol tartrate 25 mg twice daily  Metoprolol was subsequently reduced to once daily succinate formula in light of intermittent lightheadedness, and lisinopril has been reduced to 5 mg daily for the same  Subsequently, he has started cardiac rehabilitation  Initially, he had significant chest wall pain likely secondary to CPR which has resolved  He underwent repeat coronary angiography to address the left circumflex lesion on 06/07/2018    However this was only noted to be 20% stenotic with a widely patent LAD stent, and only luminal irregularities of the mid LAD  RCA was not re-imaged  Review of prior blood work 12/01/2015 revealed very high LDL at 182 mg/dL, elevated triglycerides at 167 mg/dL, HDL of 48  Atorvastatin was optimized 80 mg daily and repeat lipid panel 7/6/18 revealed LDL at 50 mg/dL, nonHDL 68 mg/dL, TG 90 mg/dL, HDL 44 mg/dL  Subsequent lipid panel 08/30/2018 revealed LDL 61 mg/dL, HDL 45 mg/dL, triglycerides 69 mg/dL, total cholesterol 120 mg/dL  Prior fasting blood glucose on 08/30/2018 was normal at 90 mg/dL with normal CBC  To follow [address] [place] [address] Dr wing for going back to work, he had repeat coronary angiography on 08/24/2018 revealing 0% stenosis at the site of the prior proximal LAD stent, and 45% mid circumflex stenosis after the 1st obtuse marginal branch (39-45% by QCA analysis)  iFR and FFR were both not hemodynamically significant, measuring 1 0 and 0 85, respectively  His distal RCA had 20% stenosis  Ejection fraction by left ventriculography was 65%  He performed excellent on his exercise nuclear stress test on 08/22/2018  Duration of exercise was 15 min and 31 sec, achieving 17 9 Mets, reaching stage 6 of the Min protocol  He achieved 92% maximal predicted heart rate while on beta blockade, with normal heart rate response and blood pressure response to exercise  His peak oxygen saturation was 100% with no ECG or SPECT evidence of myocardial ischemia, or prior infarction  Calculated left ventricular ejection fraction was 57%  Mr Becky Diaz presents today for follow-up with complaints of feeling somewhat short of breath and perhaps with some lack of energy when he starts to exercise  He thinks he maybe his blood pressure medications  He has not had episodes of dizziness or hypotension  He states he has held his lisinopril and metoprolol before he exercises several times which has helped significantly    Occasionally he will feel some level of shortness of breath at rest as well  He has not noted a decline in his functional capacity, but does not exercise to the intensity where he was prior to his myocardial infarction  Vitals:  Vitals:    11/14/18 1501   BP: 110/62   BP Location: Left arm   Patient Position: Sitting   Cuff Size: Standard   Pulse: 68   Weight: 80 9 kg (178 lb 6 4 oz)   Height: 5' 10" (1 778 m)         Past Medical History:   Diagnosis Date    Hyperlipidemia     Myocardial infarction ([address] )     cardiac stent     Social History     Social History    Marital status: /Civil Union     Spouse name: N/A    Number of children: N/A    Years of education: N/A     Occupational History     [employer]     Full-time     Social History Main Topics    Smoking status: Never Smoker    Smokeless tobacco: Never Used      Comment: No tobacco/smoke exposure    Alcohol use Yes      Comment: Social alcohol use    Drug use: No    Sexual activity: Not on file     Other Topics Concern    Not on file     Social History Narrative    Exercises regularly      Family History   Problem Relation Age of Onset    Cancer Mother     Hypertension Mother     Coronary artery disease Father     Hyperlipidemia Father     Hypertension Brother     Colon cancer Neg Hx      Past Surgical History:   Procedure Laterality Date    HIP SURGERY  2011    Trochanteric fracture requiring nail, stress fx     WISDOM TOOTH EXTRACTION         Current Outpatient Prescriptions:     aspirin 81 mg chewable tablet, Chew 81 mg daily  , Disp: , Rfl:     atorvastatin (LIPITOR) 80 mg tablet, Take 1 tablet (80 mg total) by mouth daily, Disp: 90 tablet, Rfl: 1    Cholecalciferol (VITAMIN D PO), Take 50 Units by mouth 2 (two) times a day  , Disp: , Rfl:     Coenzyme Q10 200 MG capsule, Take 200 mg by mouth daily, Disp: , Rfl:     ticagrelor (BRILINTA) 90 MG, Take 90 mg by mouth every 12 (twelve) hours, Disp: , Rfl:         Review of Systems:  Review of Systems   Respiratory: Negative  Cardiovascular: Negative  Neurological: Negative for light-headedness  All other systems reviewed and are negative  Physical Exam:  Physical Exam   Constitutional: He is oriented to person, place, and time  He appears well-developed and well-nourished  No distress  HENT:   Head: Normocephalic and atraumatic  Eyes: Pupils are equal, round, and reactive to light  EOM are normal  Right eye exhibits no discharge  No scleral icterus  Neck: Normal range of motion  Neck supple  No thyromegaly present  Cardiovascular: Normal rate, regular rhythm and normal heart sounds  Exam reveals no gallop and no friction rub  No murmur heard  Pulmonary/Chest: Effort normal and breath sounds normal    Abdominal: He exhibits no distension  There is no tenderness  There is no rebound and no guarding  Musculoskeletal: Normal range of motion  He exhibits no edema  Neurological: He is alert and oriented to person, place, and time  Coordination normal    Skin: Skin is warm and dry  No rash noted  He is not diaphoretic  No erythema  No pallor  Psychiatric: He has a normal mood and affect   His behavior is normal  Judgment and thought content normal

## 2018-12-11 ENCOUNTER — OFFICE VISIT (OUTPATIENT)
Dept: FAMILY MEDICINE CLINIC | Facility: CLINIC | Age: 53
End: 2018-12-11
Payer: COMMERCIAL

## 2018-12-11 VITALS
BODY MASS INDEX: 24.91 KG/M2 | HEIGHT: 70 IN | SYSTOLIC BLOOD PRESSURE: 114 MMHG | WEIGHT: 174 LBS | DIASTOLIC BLOOD PRESSURE: 80 MMHG | HEART RATE: 66 BPM | TEMPERATURE: 97.6 F | OXYGEN SATURATION: 98 %

## 2018-12-11 DIAGNOSIS — I46.9 CARDIAC ARREST (HCC): ICD-10-CM

## 2018-12-11 DIAGNOSIS — Z86.74 HISTORY OF SUDDEN CARDIAC ARREST SUCCESSFULLY RESUSCITATED: ICD-10-CM

## 2018-12-11 DIAGNOSIS — Z00.00 ROUTINE HEALTH MAINTENANCE: Primary | ICD-10-CM

## 2018-12-11 DIAGNOSIS — D22.9 ATYPICAL NEVI: ICD-10-CM

## 2018-12-11 PROCEDURE — 99213 OFFICE O/P EST LOW 20 MIN: CPT | Performed by: FAMILY MEDICINE

## 2018-12-11 NOTE — PROGRESS NOTES
Assessment/Plan:    He is here for annual health maintenance physical exam   He is up-to-date with all routine preventive measures  I recommended shingles vaccine and given him information  Atypical nevi  He will continue for yearly follow-up at [id number] [facility]  History of sudden cardiac arrest successfully resuscitated  He has made miraculous recovery and is back to full exercise schedule  He is approved to RTW as   Diagnoses and all orders for this visit:    Routine health maintenance    Cardiac arrest [facility])    History of sudden cardiac arrest successfully resuscitated    Atypical nevi          Subjective:      Patient ID: Ra Reddy is a 48 y o  male  He is here for physical exam   He is feeling well after cardiac arrest/resuscitation May 20th  Dr Adrienne Stacy gave full cardiac clearance in July  He ran half marathon this past weekend and he is feeling completely normal   He has very healthy diet and regular exercise  UTD with dentist   Tyson Blanton just approved his RTW          The following portions of the patient's history were reviewed and updated as appropriate: allergies, current medications, past family history, past medical history, past social history, past surgical history and problem list     Review of Systems   Constitutional: Negative for activity change, chills, fatigue and fever  HENT: Positive for congestion and postnasal drip  Negative for ear pain, sinus pressure and sore throat  Eyes: Negative for pain and visual disturbance  Respiratory: Negative for cough, chest tightness, shortness of breath and wheezing  Cardiovascular: Negative for chest pain, palpitations and leg swelling  Gastrointestinal: Negative for abdominal pain, blood in stool, constipation, diarrhea, nausea and vomiting  Endocrine: Negative for polydipsia and polyuria  Genitourinary: Negative for difficulty urinating, dysuria, frequency and urgency     Musculoskeletal: Negative for arthralgias, joint swelling and myalgias  Skin: Negative for rash  Neurological: Negative for dizziness, weakness, numbness and headaches  Hematological: Negative for adenopathy  Does not bruise/bleed easily  Psychiatric/Behavioral: Negative for dysphoric mood  The patient is not nervous/anxious            Objective:      /80   Pulse 66   Temp 97 6 °F (36 4 °C) (Temporal)   Ht 5' 9 6" (1 768 m)   Wt 78 9 kg (174 lb)   SpO2 98%   BMI 25 25 kg/m²          Physical Exam

## 2019-04-22 ENCOUNTER — TELEPHONE (OUTPATIENT)
Dept: CARDIOLOGY CLINIC | Facility: CLINIC | Age: 54
End: 2019-04-22

## 2019-04-22 ENCOUNTER — OFFICE VISIT (OUTPATIENT)
Dept: FAMILY MEDICINE CLINIC | Facility: CLINIC | Age: 54
End: 2019-04-22

## 2019-04-22 VITALS
SYSTOLIC BLOOD PRESSURE: 108 MMHG | WEIGHT: 179 LBS | HEART RATE: 58 BPM | HEIGHT: 69 IN | DIASTOLIC BLOOD PRESSURE: 70 MMHG | BODY MASS INDEX: 26.51 KG/M2

## 2019-04-22 DIAGNOSIS — Z02.89 ENCOUNTER FOR FEDERAL AVIATION ADMINISTRATION (FAA) EXAMINATION: Primary | ICD-10-CM

## 2019-04-22 PROCEDURE — 99499 UNLISTED E&M SERVICE: CPT | Performed by: FAMILY MEDICINE

## 2019-04-22 PROCEDURE — 93000 ELECTROCARDIOGRAM COMPLETE: CPT | Performed by: FAMILY MEDICINE

## 2019-05-15 ENCOUNTER — OFFICE VISIT (OUTPATIENT)
Dept: CARDIOLOGY CLINIC | Facility: CLINIC | Age: 54
End: 2019-05-15
Payer: COMMERCIAL

## 2019-05-15 VITALS
HEIGHT: 69 IN | SYSTOLIC BLOOD PRESSURE: 102 MMHG | WEIGHT: 179.4 LBS | BODY MASS INDEX: 26.57 KG/M2 | OXYGEN SATURATION: 96 % | DIASTOLIC BLOOD PRESSURE: 80 MMHG | HEART RATE: 70 BPM

## 2019-05-15 DIAGNOSIS — I25.10 CORONARY ARTERY DISEASE INVOLVING NATIVE CORONARY ARTERY OF NATIVE HEART WITHOUT ANGINA PECTORIS: Primary | ICD-10-CM

## 2019-05-15 DIAGNOSIS — E78.5 DYSLIPIDEMIA: ICD-10-CM

## 2019-05-15 PROCEDURE — 99214 OFFICE O/P EST MOD 30 MIN: CPT | Performed by: INTERNAL MEDICINE

## 2019-06-07 ENCOUNTER — OFFICE VISIT (OUTPATIENT)
Dept: FAMILY MEDICINE CLINIC | Facility: CLINIC | Age: 54
End: 2019-06-07
Payer: COMMERCIAL

## 2019-06-07 VITALS
WEIGHT: 179 LBS | BODY MASS INDEX: 26.51 KG/M2 | SYSTOLIC BLOOD PRESSURE: 110 MMHG | HEART RATE: 77 BPM | DIASTOLIC BLOOD PRESSURE: 72 MMHG | OXYGEN SATURATION: 96 % | HEIGHT: 69 IN | TEMPERATURE: 98 F

## 2019-06-07 DIAGNOSIS — J01.90 ACUTE NON-RECURRENT SINUSITIS, UNSPECIFIED LOCATION: Primary | ICD-10-CM

## 2019-06-07 PROCEDURE — 3008F BODY MASS INDEX DOCD: CPT | Performed by: NURSE PRACTITIONER

## 2019-06-07 PROCEDURE — 99213 OFFICE O/P EST LOW 20 MIN: CPT | Performed by: NURSE PRACTITIONER

## 2019-06-07 PROCEDURE — 1036F TOBACCO NON-USER: CPT | Performed by: NURSE PRACTITIONER

## 2019-06-07 RX ORDER — BENZONATATE 100 MG/1
100 CAPSULE ORAL 3 TIMES DAILY PRN
Qty: 20 CAPSULE | Refills: 0 | Status: SHIPPED | OUTPATIENT
Start: 2019-06-07 | End: 2020-06-19

## 2019-06-07 RX ORDER — DOXYCYCLINE 100 MG/1
100 TABLET ORAL 2 TIMES DAILY
Qty: 14 TABLET | Refills: 0 | Status: SHIPPED | OUTPATIENT
Start: 2019-06-07 | End: 2019-06-14

## 2019-06-07 RX ORDER — PREDNISONE 20 MG/1
40 TABLET ORAL DAILY
Qty: 6 TABLET | Refills: 0 | Status: SHIPPED | OUTPATIENT
Start: 2019-06-07 | End: 2019-06-10

## 2019-08-02 ENCOUNTER — TELEPHONE (OUTPATIENT)
Dept: NON INVASIVE DIAGNOSTICS | Facility: HOSPITAL | Age: 54
End: 2019-08-02

## 2019-08-06 ENCOUNTER — HOSPITAL ENCOUNTER (OUTPATIENT)
Dept: NON INVASIVE DIAGNOSTICS | Facility: HOSPITAL | Age: 54
Discharge: HOME | End: 2019-08-06
Attending: INTERNAL MEDICINE
Payer: COMMERCIAL

## 2019-08-06 DIAGNOSIS — I25.10 CORONARY ARTERY DISEASE INVOLVING NATIVE CORONARY ARTERY OF NATIVE HEART WITHOUT ANGINA PECTORIS: ICD-10-CM

## 2019-08-06 LAB
ARRHY DURING EX: NORMAL
BUN SERPL-MCNC: 16 MG/DL (ref 7–25)
BUN/CREAT SERPL: NORMAL (CALC) (ref 6–22)
CALCIUM SERPL-MCNC: 8.9 MG/DL (ref 8.6–10.3)
CHEST PAIN STATEMENT: NORMAL
CHLORIDE SERPL-SCNC: 103 MMOL/L (ref 98–110)
CHOLEST SERPL-MCNC: 173 MG/DL
CHOLEST/HDLC SERPL: 4 (CALC)
CO2 SERPL-SCNC: 28 MMOL/L (ref 20–32)
CREAT SERPL-MCNC: 1.06 MG/DL (ref 0.7–1.33)
GLUCOSE SERPL-MCNC: 94 MG/DL (ref 65–99)
HDLC SERPL-MCNC: 43 MG/DL
LDLC SERPL CALC-MCNC: 105 MG/DL (CALC)
MAX DIASTOLIC BP: 82 MMHG
MAX HEART RATE: 169 BPM
MAX PREDICTED HEART RATE: 168 BPM
MAX. SYSTOLIC BP: 188 MMHG
NONHDLC SERPL-MCNC: 130 MG/DL (CALC)
POTASSIUM SERPL-SCNC: 4.1 MMOL/L (ref 3.5–5.3)
PROTOCOL NAME: NORMAL
SL AMB EGFR AFRICAN AMERICAN: 93 ML/MIN/1.73M2
SL AMB EGFR NON AFRICAN AMERICAN: 80 ML/MIN/1.73M2
SODIUM SERPL-SCNC: 138 MMOL/L (ref 135–146)
TARGET HR FORMULA: NORMAL
TEST INDICATION: NORMAL
TIME IN EXERCISE PHASE: NORMAL
TRIGL SERPL-MCNC: 142 MG/DL

## 2019-08-06 PROCEDURE — 93017 CV STRESS TEST TRACING ONLY: CPT

## 2019-08-15 DIAGNOSIS — E78.5 DYSLIPIDEMIA: Primary | ICD-10-CM

## 2019-08-15 NOTE — PROGRESS NOTES
REviewed lipid panel  LDL increased  Called pt, no idea why LDL-C increased by about 40 mg/dL  No new supplements or change in diet, pt reports compliance w/ atorvastatin, no change in  that he can tell, no increase in alcohol intake  Will recheck lipid panel and TSH in 1 month  Pt will postpone appt to 6 weeks later

## 2019-08-19 ENCOUNTER — TELEPHONE (OUTPATIENT)
Dept: CARDIOLOGY CLINIC | Facility: CLINIC | Age: 54
End: 2019-08-19

## 2019-08-19 NOTE — TELEPHONE ENCOUNTER
----- Message from Angelika Xie DO sent at 8/15/2019  2:29 PM EDT -----  Plz cancel pt's appt next week and give him one for 6 weeks later  Please give him printout of bloodwork when he comes to drop off paperwork next week    Thx    RP

## 2019-10-03 ENCOUNTER — OFFICE VISIT (OUTPATIENT)
Dept: FAMILY MEDICINE CLINIC | Facility: CLINIC | Age: 54
End: 2019-10-03

## 2019-10-03 DIAGNOSIS — Z02.89 ENCOUNTER FOR FEDERAL AVIATION ADMINISTRATION (FAA) EXAMINATION: Primary | ICD-10-CM

## 2019-10-03 PROCEDURE — 99499 UNLISTED E&M SERVICE: CPT | Performed by: FAMILY MEDICINE

## 2019-10-03 NOTE — PROGRESS NOTES
Chief Complaint   Patient presents with    Physical Exam     [facility] 1st class # [id number]  Yes Meds   No EKG

## 2019-11-01 ENCOUNTER — TELEPHONE (OUTPATIENT)
Dept: CARDIOLOGY CLINIC | Facility: CLINIC | Age: 54
End: 2019-11-01

## 2019-11-01 LAB
CHOLEST SERPL-MCNC: 140 MG/DL
CHOLEST/HDLC SERPL: 2.9 (CALC)
HDLC SERPL-MCNC: 49 MG/DL
LDLC SERPL CALC-MCNC: 74 MG/DL (CALC)
NONHDLC SERPL-MCNC: 91 MG/DL (CALC)
TRIGL SERPL-MCNC: 91 MG/DL
TSH SERPL-ACNC: 2.08 MIU/L (ref 0.4–4.5)

## 2019-11-01 NOTE — TELEPHONE ENCOUNTER
Left message patient voice mail, per Dr Yoli Chan    Labs improved, continue same med, follow up appt 11/22/19

## 2019-11-01 NOTE — TELEPHONE ENCOUNTER
----- Message from Antony Bal DO sent at 11/1/2019  1:49 PM EDT -----  Please let pt know LDL cholesterol improved, now 74 down from 105 (from 8/6/19)  Will continue current regimen and see him as schedule later this month    Thx    RP

## 2020-04-13 ENCOUNTER — OFFICE VISIT (OUTPATIENT)
Dept: FAMILY MEDICINE CLINIC | Facility: CLINIC | Age: 55
End: 2020-04-13

## 2020-04-13 VITALS
BODY MASS INDEX: 26.81 KG/M2 | DIASTOLIC BLOOD PRESSURE: 80 MMHG | HEIGHT: 69 IN | WEIGHT: 181 LBS | SYSTOLIC BLOOD PRESSURE: 122 MMHG | HEART RATE: 52 BPM

## 2020-04-13 DIAGNOSIS — Z02.89 ENCOUNTER FOR FEDERAL AVIATION ADMINISTRATION (FAA) EXAMINATION: Primary | ICD-10-CM

## 2020-04-13 PROCEDURE — 3008F BODY MASS INDEX DOCD: CPT | Performed by: FAMILY MEDICINE

## 2020-04-13 PROCEDURE — 99499 UNLISTED E&M SERVICE: CPT | Performed by: FAMILY MEDICINE

## 2020-04-13 PROCEDURE — 3074F SYST BP LT 130 MM HG: CPT | Performed by: FAMILY MEDICINE

## 2020-04-13 PROCEDURE — 93000 ELECTROCARDIOGRAM COMPLETE: CPT | Performed by: FAMILY MEDICINE

## 2020-04-13 PROCEDURE — 3079F DIAST BP 80-89 MM HG: CPT | Performed by: FAMILY MEDICINE

## 2020-05-29 DIAGNOSIS — I25.10 CORONARY ARTERY DISEASE INVOLVING NATIVE CORONARY ARTERY OF NATIVE HEART WITHOUT ANGINA PECTORIS: Primary | ICD-10-CM

## 2020-06-18 ENCOUNTER — APPOINTMENT (OUTPATIENT)
Dept: LAB | Facility: CLINIC | Age: 55
End: 2020-06-18
Payer: COMMERCIAL

## 2020-06-18 DIAGNOSIS — E78.5 DYSLIPIDEMIA: ICD-10-CM

## 2020-06-18 LAB
ANION GAP SERPL CALCULATED.3IONS-SCNC: 4 MMOL/L (ref 4–13)
BUN SERPL-MCNC: 15 MG/DL (ref 5–25)
CALCIUM SERPL-MCNC: 8.7 MG/DL (ref 8.3–10.1)
CHLORIDE SERPL-SCNC: 104 MMOL/L (ref 100–108)
CHOLEST SERPL-MCNC: 141 MG/DL (ref 50–200)
CO2 SERPL-SCNC: 29 MMOL/L (ref 21–32)
CREAT SERPL-MCNC: 0.94 MG/DL (ref 0.6–1.3)
GFR SERPL CREATININE-BSD FRML MDRD: 92 ML/MIN/1.73SQ M
GLUCOSE P FAST SERPL-MCNC: 88 MG/DL (ref 65–99)
HDLC SERPL-MCNC: 47 MG/DL
LDLC SERPL CALC-MCNC: 58 MG/DL (ref 0–100)
POTASSIUM SERPL-SCNC: 4.4 MMOL/L (ref 3.5–5.3)
SODIUM SERPL-SCNC: 137 MMOL/L (ref 136–145)
TRIGL SERPL-MCNC: 178 MG/DL
TSH SERPL DL<=0.05 MIU/L-ACNC: 2.92 UIU/ML (ref 0.36–3.74)

## 2020-06-18 PROCEDURE — 80048 BASIC METABOLIC PNL TOTAL CA: CPT | Performed by: INTERNAL MEDICINE

## 2020-06-18 PROCEDURE — 84443 ASSAY THYROID STIM HORMONE: CPT | Performed by: INTERNAL MEDICINE

## 2020-06-18 PROCEDURE — 36415 COLL VENOUS BLD VENIPUNCTURE: CPT | Performed by: INTERNAL MEDICINE

## 2020-06-18 PROCEDURE — 80061 LIPID PANEL: CPT

## 2020-06-19 ENCOUNTER — OFFICE VISIT (OUTPATIENT)
Dept: CARDIOLOGY CLINIC | Facility: CLINIC | Age: 55
End: 2020-06-19
Payer: COMMERCIAL

## 2020-06-19 VITALS
DIASTOLIC BLOOD PRESSURE: 90 MMHG | SYSTOLIC BLOOD PRESSURE: 138 MMHG | HEART RATE: 64 BPM | BODY MASS INDEX: 26.84 KG/M2 | TEMPERATURE: 99.2 F | RESPIRATION RATE: 16 BRPM | HEIGHT: 69 IN | WEIGHT: 181.2 LBS

## 2020-06-19 DIAGNOSIS — E78.5 DYSLIPIDEMIA: ICD-10-CM

## 2020-06-19 DIAGNOSIS — I25.10 ATHEROSCLEROSIS OF NATIVE CORONARY ARTERY OF NATIVE HEART WITHOUT ANGINA PECTORIS: Primary | ICD-10-CM

## 2020-06-19 PROCEDURE — 1036F TOBACCO NON-USER: CPT | Performed by: INTERNAL MEDICINE

## 2020-06-19 PROCEDURE — 3080F DIAST BP >= 90 MM HG: CPT | Performed by: INTERNAL MEDICINE

## 2020-06-19 PROCEDURE — 3075F SYST BP GE 130 - 139MM HG: CPT | Performed by: INTERNAL MEDICINE

## 2020-06-19 PROCEDURE — 99214 OFFICE O/P EST MOD 30 MIN: CPT | Performed by: INTERNAL MEDICINE

## 2020-06-19 PROCEDURE — 3008F BODY MASS INDEX DOCD: CPT | Performed by: INTERNAL MEDICINE

## 2020-06-26 DIAGNOSIS — E78.5 DYSLIPIDEMIA: ICD-10-CM

## 2020-06-26 DIAGNOSIS — I25.10 CORONARY ARTERY DISEASE INVOLVING NATIVE HEART, ANGINA PRESENCE UNSPECIFIED, UNSPECIFIED VESSEL OR LESION TYPE: ICD-10-CM

## 2020-06-26 RX ORDER — ATORVASTATIN CALCIUM 80 MG/1
80 TABLET, FILM COATED ORAL DAILY
Qty: 90 TABLET | Refills: 1
Start: 2020-06-26 | End: 2020-12-29 | Stop reason: SDUPTHER

## 2020-06-29 ENCOUNTER — TELEPHONE (OUTPATIENT)
Dept: CARDIOLOGY CLINIC | Facility: CLINIC | Age: 55
End: 2020-06-29

## 2020-08-04 ENCOUNTER — HOSPITAL ENCOUNTER (OUTPATIENT)
Dept: NON INVASIVE DIAGNOSTICS | Facility: HOSPITAL | Age: 55
Discharge: HOME | End: 2020-08-04
Attending: INTERNAL MEDICINE
Payer: COMMERCIAL

## 2020-08-04 DIAGNOSIS — I25.10 CORONARY ARTERY DISEASE INVOLVING NATIVE CORONARY ARTERY OF NATIVE HEART WITHOUT ANGINA PECTORIS: ICD-10-CM

## 2020-08-04 PROCEDURE — 93018 CV STRESS TEST I&R ONLY: CPT | Performed by: INTERNAL MEDICINE

## 2020-08-04 PROCEDURE — 93017 CV STRESS TEST TRACING ONLY: CPT

## 2020-08-04 PROCEDURE — 93016 CV STRESS TEST SUPVJ ONLY: CPT | Performed by: INTERNAL MEDICINE

## 2020-08-06 LAB
ARRHY DURING EX: NORMAL
CHEST PAIN STATEMENT: NORMAL
MAX DIASTOLIC BP: 80 MMHG
MAX HEART RATE: 169 BPM
MAX PREDICTED HEART RATE: 167 BPM
MAX. SYSTOLIC BP: 190 MMHG
PROTOCOL NAME: NORMAL
TARGET HR FORMULA: NORMAL
TEST INDICATION: NORMAL
TIME IN EXERCISE PHASE: NORMAL

## 2020-08-10 ENCOUNTER — OFFICE VISIT (OUTPATIENT)
Dept: CARDIOLOGY CLINIC | Facility: CLINIC | Age: 55
End: 2020-08-10
Payer: COMMERCIAL

## 2020-08-10 VITALS
SYSTOLIC BLOOD PRESSURE: 124 MMHG | TEMPERATURE: 98 F | BODY MASS INDEX: 26.36 KG/M2 | HEART RATE: 66 BPM | DIASTOLIC BLOOD PRESSURE: 80 MMHG | WEIGHT: 178 LBS | HEIGHT: 69 IN

## 2020-08-10 DIAGNOSIS — I10 BENIGN ESSENTIAL HTN: ICD-10-CM

## 2020-08-10 DIAGNOSIS — I25.10 CORONARY ARTERY DISEASE INVOLVING NATIVE CORONARY ARTERY OF NATIVE HEART WITHOUT ANGINA PECTORIS: Primary | ICD-10-CM

## 2020-08-10 DIAGNOSIS — E78.5 DYSLIPIDEMIA: ICD-10-CM

## 2020-08-10 PROCEDURE — 1036F TOBACCO NON-USER: CPT | Performed by: INTERNAL MEDICINE

## 2020-08-10 PROCEDURE — 99214 OFFICE O/P EST MOD 30 MIN: CPT | Performed by: INTERNAL MEDICINE

## 2020-08-10 PROCEDURE — 3074F SYST BP LT 130 MM HG: CPT | Performed by: INTERNAL MEDICINE

## 2020-08-10 PROCEDURE — 3079F DIAST BP 80-89 MM HG: CPT | Performed by: INTERNAL MEDICINE

## 2020-08-10 PROCEDURE — 3008F BODY MASS INDEX DOCD: CPT | Performed by: INTERNAL MEDICINE

## 2020-08-10 NOTE — PROGRESS NOTES
Cardiology   MD Cloe Mas MD Ather Mansoor, MD Lott Hasty, DO, Roseann Perdue DO, [facility] [place]  -------------------------------------------------------------------  [facility] and [facility], [address] [id number] [phone]                           Marissa Spurling                   1965                   [id number]          Assessment/Plan:    1   Anterior wall ST-elevation myocardial infarction 4/42/3680, complicated by VF arrest, status post defibrillation x3, therapeutic hypothermia, s/p PCI/KASSANDRA to proximal LAD  2   CAD with residual 45% nonobstructive left circumflex disease (not hemodynamically significant by iFR and FFR testing as well as by nuclear stress testing)  3   Dyslipidemia, high suspicion of heterozygous familial hypercholesterolemia (LDL in 2015 up to 182 mg/dL in the setting of regular exercise and extremely healthy lifestyle, father with myocardial infarction at age 46)    · Patient will call his [address] physicians to see what the next step is in light of his abnormal treadmill exercise stress test that he recently had  This may be a false-positive ECG finding, as he had no symptoms with exercise whatsoever, and continues have a very good functional capacity  I recommended consideration of a nuclear stress test, but the patient will check with FAA protocol to see with the next episode BP and call us back  · Will check lipid panel before next visit in 3 months to revisit hypertriglyceridemia noted on last blood work  · Blood pressure improved since last visit  He has not been checking his blood pressures at home      Follow-up in 3 months after lipid panel  He will call to discuss FAA protocol after abnormal recent treadmill exercise stress test        Interval History:      This is a very pleasant 79-year-old male with a history of dyslipidemia and family history of premature CAD with father having myocardial infarction at age 46  Jay Keith is a very health conscious   To review his case, on 05/20/2018 several hours into a triathlon, while running, he experienced a cardiac arrest which was witnessed   Fortunately, an AED was applied and he received 3 shocks and subsequent CPR  Jay Keith presented to [address] [place] [address] at which time ECG revealed anterior ST elevations   Emergent cardiac catheterization revealed 100% occlusion of proximal LAD and he thereafter underwent PCI/KASSANDRA with 3 5 x 23 mm stent to the proximal LAD   He had 10% mid RCA disease and reported 80% mid circumflex disease  He underwent therapeutic hypothermia with subsequent mild cognitive decline and transient memory loss, which has subsequently resolved   Echocardiogram 05/23/2018 revealed slight improvement in left ventricular ejection fraction at 50%        Metoprolol and lisinopril were stopped afterward due to lightheadedness  He was maintained on aspirin, Brilinta, high-dose atorvastatin, lisinopril 10 mg daily, metoprolol tartrate 25 mg twice daily       He underwent repeat coronary angiography to address the left circumflex lesion on 06/07/2018  Gabby Tomas this was only noted to be 20% stenotic with a widely patent LAD stent, and only luminal irregularities of the mid LAD   RCA was not re-imaged       To follow [address] regulations for going back to work, he had repeat coronary angiography on 08/24/2018 revealing 0% stenosis at the site of the prior proximal LAD stent, and 45% mid circumflex stenosis after the 1st obtuse marginal branch (39-45% by QCA analysis)   iFR and FFR were both not hemodynamically significant, measuring 1 0 and 0 85, respectively   His distal RCA had 20% stenosis   Ejection fraction by left ventriculography was 65%      He performed excellent on his exercise nuclear stress test on 08/22/2018 and subsequently on his treadmill exercise stress test 08/06/2019        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  This revealed 1 mm ST-segment depressions at peak exercise and very early recovery which quickly resolved  He had a very good functional capacity of 18 meds, and no evidence of dysrhythmia or chest pain  From a symptomatic standpoint he feels well without exertional chest pain, shortness of breath, dizziness, palpitations, lower extremity edema, claudication  Vitals: There were no vitals filed for this visit  Past Medical History:   Diagnosis Date    Hyperlipidemia     Myocardial infarction [facility])     cardiac stent     Social History     Socioeconomic History    Marital status: /Civil Union     Spouse name: Not on file    Number of children: Not on file    Years of education: Not on file    Highest education level: Not on file   Occupational History    Occupation:      Employer: [employer]     Comment: Full-time   Social Needs    Financial resource strain: Not on file    Food insecurity     Worry: Not on file     Inability: Not on file   [employer] needs     Medical: Not on file     Non-medical: Not on file   Tobacco Use    Smoking status: Never Smoker    Smokeless tobacco: Never Used    Tobacco comment: No tobacco/smoke exposure   Substance and Sexual Activity    Alcohol use:  Yes     Alcohol/week: 7 0 - 10 0 standard drinks     Types: 7 - 10 Cans of beer per week     Comment: Social alcohol use    Drug use: No    Sexual activity: Not on file   Lifestyle    Physical activity     Days per week: Not on file     Minutes per session: Not on file    Stress: Not on file   Relationships    Social connections     Talks on phone: Not on file     Gets together: Not on file     Attends Congregational service: Not on file     Active member of club or organization: Not on file     Attends meetings of clubs or organizations: Not on file     Relationship status: Not on file    Intimate partner violence     Fear of current or ex partner: Not on file     Emotionally abused: Not on file     Physically abused: Not on file     Forced sexual activity: Not on file   Other Topics Concern    Not on file   Social History Narrative    Exercises regularly      Family History   Problem Relation Age of Onset    Cancer Mother     Hypertension Mother     Coronary artery disease Father     Hyperlipidemia Father     Hypertension Brother     Colon cancer Neg Hx      Past Surgical History:   Procedure Laterality Date    HIP SURGERY  2011    Trochanteric fracture requiring nail, stress fx     WISDOM TOOTH EXTRACTION         Current Outpatient Medications:     aspirin 81 mg chewable tablet, Chew 81 mg daily  , Disp: , Rfl:     atorvastatin (LIPITOR) 80 mg tablet, Take 1 tablet (80 mg total) by mouth daily, Disp: 90 tablet, Rfl: 1    Cholecalciferol (VITAMIN D PO), Take 50 Units by mouth 2 (two) times a day  , Disp: , Rfl:     Coenzyme Q10 200 MG capsule, Take 200 mg by mouth daily, Disp: , Rfl:         Review of Systems:  Review of Systems   Respiratory: Negative  Cardiovascular: Negative  All other systems reviewed and are negative  Physical Exam:  Physical Exam  Constitutional:       General: He is not in acute distress  Appearance: He is well-developed  He is not diaphoretic  HENT:      Head: Normocephalic and atraumatic  Eyes:      General: No scleral icterus  Right eye: No discharge  Pupils: Pupils are equal, round, and reactive to light  Neck:      Musculoskeletal: Normal range of motion and neck supple  Thyroid: No thyromegaly  Cardiovascular:      Rate and Rhythm: Normal rate and regular rhythm  Heart sounds: Normal heart sounds  No murmur  No friction rub  No gallop  Pulmonary:      Effort: Pulmonary effort is normal       Breath sounds: Normal breath sounds  Abdominal:      General: There is no distension  Tenderness: There is no abdominal tenderness  There is no guarding or rebound  Musculoskeletal: Normal range of motion  Skin:     General: Skin is warm and dry  Coloration: Skin is not pale  Findings: No erythema or rash  Neurological:      Mental Status: He is alert and oriented to person, place, and time  Coordination: Coordination normal    Psychiatric:         Behavior: Behavior normal          Thought Content: Thought content normal          Judgment: Judgment normal          This note was completed in part utilizing [employer] Software  Grammatical errors, random word insertions, spelling mistakes, and incomplete sentences can be an occasional consequence of this system secondary to software limitations, ambient noise, and hardware issues  If you have any questions or concerns about the content, text, or information contained within the body of this dictation, please contact the provider for clarification

## 2020-08-17 ENCOUNTER — TELEPHONE (OUTPATIENT)
Dept: CARDIOLOGY CLINIC | Facility: CLINIC | Age: 55
End: 2020-08-17

## 2020-08-17 NOTE — TELEPHONE ENCOUNTER
Pt calling to follow up on letter sent to [facility]  Pt states that letter from Dr Marrian Severe needs to clarify letter stating 1 mm shift was lead error and no further testing is, clearing up any second guessing  Or pt must go for Nuc  Stress test  Pt would like to discuss more with Dr Marrian Severe

## 2020-08-19 DIAGNOSIS — R94.31 ABNORMAL ECG DURING EXERCISE STRESS TEST: Primary | ICD-10-CM

## 2020-08-25 ENCOUNTER — HOSPITAL ENCOUNTER (OUTPATIENT)
Dept: NON INVASIVE DIAGNOSTICS | Facility: CLINIC | Age: 55
Discharge: HOME | End: 2020-08-25
Payer: COMMERCIAL

## 2020-08-25 ENCOUNTER — TELEPHONE (OUTPATIENT)
Dept: CARDIOLOGY CLINIC | Facility: CLINIC | Age: 55
End: 2020-08-25

## 2020-08-25 DIAGNOSIS — R94.31 ABNORMAL ECG DURING EXERCISE STRESS TEST: ICD-10-CM

## 2020-08-25 PROCEDURE — 93016 CV STRESS TEST SUPVJ ONLY: CPT | Performed by: INTERNAL MEDICINE

## 2020-08-25 PROCEDURE — 78452 HT MUSCLE IMAGE SPECT MULT: CPT | Performed by: INTERNAL MEDICINE

## 2020-08-25 PROCEDURE — 93017 CV STRESS TEST TRACING ONLY: CPT

## 2020-08-25 PROCEDURE — 93018 CV STRESS TEST I&R ONLY: CPT | Performed by: INTERNAL MEDICINE

## 2020-08-25 PROCEDURE — 78452 HT MUSCLE IMAGE SPECT MULT: CPT

## 2020-08-25 PROCEDURE — A9502 TC99M TETROFOSMIN: HCPCS

## 2020-08-25 PROCEDURE — G1004 CDSM NDSC: HCPCS

## 2020-08-25 NOTE — TELEPHONE ENCOUNTER
Jimmie YEUNG,  Please let him know that nuclear stress test was normal   Have typed up a new letter for him  If he would like to pick appy can  tomorrow  Otherwise, if he can provide us with a fax number or mailing address, we can certainly send in for him  Thank you

## 2020-08-26 LAB
CHEST PAIN STATEMENT: NORMAL
MAX DIASTOLIC BP: 84 MMHG
MAX HEART RATE: 171 BPM
MAX PREDICTED HEART RATE: 167 BPM
MAX. SYSTOLIC BP: 210 MMHG
PROTOCOL NAME: NORMAL
REASON FOR TERMINATION: NORMAL
TARGET HR FORMULA: NORMAL
TEST INDICATION: NORMAL
TIME IN EXERCISE PHASE: NORMAL

## 2020-08-26 NOTE — TELEPHONE ENCOUNTER
Phone call to patient  reviewed response from dr Loraine Melton  Patient is able to use my chart and find letter from dr Loraine Melton  He will print letter and send to appropriate person

## 2020-11-10 ENCOUNTER — OFFICE VISIT (OUTPATIENT)
Dept: FAMILY MEDICINE CLINIC | Facility: CLINIC | Age: 55
End: 2020-11-10

## 2020-11-10 ENCOUNTER — APPOINTMENT (OUTPATIENT)
Dept: LAB | Facility: CLINIC | Age: 55
End: 2020-11-10
Payer: COMMERCIAL

## 2020-11-10 VITALS
HEIGHT: 69 IN | DIASTOLIC BLOOD PRESSURE: 88 MMHG | WEIGHT: 183 LBS | BODY MASS INDEX: 27.11 KG/M2 | HEART RATE: 84 BPM | SYSTOLIC BLOOD PRESSURE: 150 MMHG

## 2020-11-10 DIAGNOSIS — E78.5 DYSLIPIDEMIA: ICD-10-CM

## 2020-11-10 DIAGNOSIS — Z02.89 ENCOUNTER FOR FEDERAL AVIATION ADMINISTRATION (FAA) EXAMINATION: Primary | ICD-10-CM

## 2020-11-10 LAB
CHOLEST SERPL-MCNC: 147 MG/DL (ref 50–200)
HDLC SERPL-MCNC: 47 MG/DL
LDLC SERPL CALC-MCNC: 75 MG/DL (ref 0–100)
TRIGL SERPL-MCNC: 123 MG/DL

## 2020-11-10 PROCEDURE — 93000 ELECTROCARDIOGRAM COMPLETE: CPT | Performed by: FAMILY MEDICINE

## 2020-11-10 PROCEDURE — 99499 UNLISTED E&M SERVICE: CPT | Performed by: FAMILY MEDICINE

## 2020-11-10 PROCEDURE — 80061 LIPID PANEL: CPT

## 2020-11-10 PROCEDURE — 36415 COLL VENOUS BLD VENIPUNCTURE: CPT

## 2020-11-11 ENCOUNTER — OFFICE VISIT (OUTPATIENT)
Dept: CARDIOLOGY CLINIC | Facility: CLINIC | Age: 55
End: 2020-11-11
Payer: COMMERCIAL

## 2020-11-11 VITALS
HEART RATE: 68 BPM | HEIGHT: 69 IN | WEIGHT: 179 LBS | RESPIRATION RATE: 16 BRPM | SYSTOLIC BLOOD PRESSURE: 108 MMHG | TEMPERATURE: 97.3 F | BODY MASS INDEX: 26.51 KG/M2 | DIASTOLIC BLOOD PRESSURE: 82 MMHG

## 2020-11-11 DIAGNOSIS — I25.10 CORONARY ARTERY DISEASE INVOLVING NATIVE CORONARY ARTERY OF NATIVE HEART WITHOUT ANGINA PECTORIS: Primary | ICD-10-CM

## 2020-11-11 PROCEDURE — 1036F TOBACCO NON-USER: CPT | Performed by: INTERNAL MEDICINE

## 2020-11-11 PROCEDURE — 3008F BODY MASS INDEX DOCD: CPT | Performed by: INTERNAL MEDICINE

## 2020-11-11 PROCEDURE — 99214 OFFICE O/P EST MOD 30 MIN: CPT | Performed by: INTERNAL MEDICINE

## 2020-12-29 DIAGNOSIS — I25.10 CORONARY ARTERY DISEASE INVOLVING NATIVE HEART, ANGINA PRESENCE UNSPECIFIED, UNSPECIFIED VESSEL OR LESION TYPE: ICD-10-CM

## 2020-12-29 DIAGNOSIS — E78.5 DYSLIPIDEMIA: ICD-10-CM

## 2020-12-30 RX ORDER — ATORVASTATIN CALCIUM 80 MG/1
80 TABLET, FILM COATED ORAL DAILY
Qty: 90 TABLET | Refills: 3
Start: 2020-12-30 | End: 2021-12-15

## 2021-01-06 ENCOUNTER — OFFICE VISIT (OUTPATIENT)
Dept: CARDIOLOGY CLINIC | Facility: CLINIC | Age: 56
End: 2021-01-06
Payer: COMMERCIAL

## 2021-01-06 VITALS
WEIGHT: 180 LBS | OXYGEN SATURATION: 97 % | HEIGHT: 69 IN | BODY MASS INDEX: 26.66 KG/M2 | TEMPERATURE: 97.8 F | HEART RATE: 70 BPM | DIASTOLIC BLOOD PRESSURE: 60 MMHG | SYSTOLIC BLOOD PRESSURE: 114 MMHG

## 2021-01-06 DIAGNOSIS — E78.5 DYSLIPIDEMIA: ICD-10-CM

## 2021-01-06 DIAGNOSIS — I25.10 CORONARY ARTERY DISEASE INVOLVING NATIVE CORONARY ARTERY OF NATIVE HEART WITHOUT ANGINA PECTORIS: Primary | ICD-10-CM

## 2021-01-06 PROCEDURE — 99214 OFFICE O/P EST MOD 30 MIN: CPT | Performed by: INTERNAL MEDICINE

## 2021-01-06 PROCEDURE — 3078F DIAST BP <80 MM HG: CPT | Performed by: INTERNAL MEDICINE

## 2021-01-06 PROCEDURE — 3725F SCREEN DEPRESSION PERFORMED: CPT | Performed by: INTERNAL MEDICINE

## 2021-01-06 PROCEDURE — 3008F BODY MASS INDEX DOCD: CPT | Performed by: INTERNAL MEDICINE

## 2021-01-06 PROCEDURE — 3074F SYST BP LT 130 MM HG: CPT | Performed by: INTERNAL MEDICINE

## 2021-01-06 PROCEDURE — 93000 ELECTROCARDIOGRAM COMPLETE: CPT | Performed by: INTERNAL MEDICINE

## 2021-03-10 DIAGNOSIS — Z23 ENCOUNTER FOR IMMUNIZATION: ICD-10-CM

## 2021-04-12 ENCOUNTER — IMMUNIZATIONS (OUTPATIENT)
Dept: FAMILY MEDICINE CLINIC | Facility: HOSPITAL | Age: 56
End: 2021-04-12

## 2021-04-12 DIAGNOSIS — Z23 ENCOUNTER FOR IMMUNIZATION: Primary | ICD-10-CM

## 2021-04-12 PROCEDURE — 0011A SARS-COV-2 / COVID-19 MRNA VACCINE (MODERNA) 100 MCG: CPT

## 2021-04-12 PROCEDURE — 91301 SARS-COV-2 / COVID-19 MRNA VACCINE (MODERNA) 100 MCG: CPT

## 2021-05-10 ENCOUNTER — IMMUNIZATIONS (OUTPATIENT)
Dept: FAMILY MEDICINE CLINIC | Facility: HOSPITAL | Age: 56
End: 2021-05-10

## 2021-05-10 DIAGNOSIS — Z23 ENCOUNTER FOR IMMUNIZATION: Primary | ICD-10-CM

## 2021-05-10 PROCEDURE — 0012A SARS-COV-2 / COVID-19 MRNA VACCINE (MODERNA) 100 MCG: CPT

## 2021-05-10 PROCEDURE — 91301 SARS-COV-2 / COVID-19 MRNA VACCINE (MODERNA) 100 MCG: CPT

## 2021-05-11 ENCOUNTER — OFFICE VISIT (OUTPATIENT)
Dept: FAMILY MEDICINE CLINIC | Facility: CLINIC | Age: 56
End: 2021-05-11
Payer: COMMERCIAL

## 2021-05-11 VITALS
DIASTOLIC BLOOD PRESSURE: 70 MMHG | HEART RATE: 50 BPM | HEIGHT: 69 IN | BODY MASS INDEX: 27.55 KG/M2 | SYSTOLIC BLOOD PRESSURE: 130 MMHG | WEIGHT: 186 LBS

## 2021-05-11 DIAGNOSIS — Z02.89 ENCOUNTER FOR FEDERAL AVIATION ADMINISTRATION (FAA) EXAMINATION: Primary | ICD-10-CM

## 2021-05-11 PROCEDURE — 99499 UNLISTED E&M SERVICE: CPT | Performed by: FAMILY MEDICINE

## 2021-05-11 NOTE — PROGRESS NOTES
Chief Complaint   Patient presents with    Physical Exam     1st Class [employer]#[id number], with EKG

## 2021-07-14 ENCOUNTER — OFFICE VISIT (OUTPATIENT)
Dept: URGENT CARE | Facility: MEDICAL CENTER | Age: 56
End: 2021-07-14
Payer: COMMERCIAL

## 2021-07-14 VITALS
BODY MASS INDEX: 27.47 KG/M2 | SYSTOLIC BLOOD PRESSURE: 110 MMHG | DIASTOLIC BLOOD PRESSURE: 70 MMHG | HEART RATE: 82 BPM | WEIGHT: 186 LBS | TEMPERATURE: 98.9 F | RESPIRATION RATE: 18 BRPM | OXYGEN SATURATION: 97 %

## 2021-07-14 DIAGNOSIS — R19.7 DIARRHEA OF PRESUMED INFECTIOUS ORIGIN: Primary | ICD-10-CM

## 2021-07-14 DIAGNOSIS — R19.7 DIARRHEA, UNSPECIFIED TYPE: Primary | ICD-10-CM

## 2021-07-14 PROCEDURE — G0382 LEV 3 HOSP TYPE B ED VISIT: HCPCS | Performed by: PHYSICIAN ASSISTANT

## 2021-07-14 RX ORDER — COVID-19 TEST SPECIMEN COLLECT
MISCELLANEOUS MISCELLANEOUS
COMMUNITY
Start: 2021-06-17

## 2021-07-14 NOTE — PROGRESS NOTES
Diarrhea since return from [facility] 10 d ago  Neg Covid testing    Stool cultures ordered

## 2021-07-14 NOTE — PATIENT INSTRUCTIONS
Traveler's Diarrhea   WHAT YOU NEED TO KNOW:   Traveler's diarrhea occurs during travel or within 10 days after you travel  You can get traveler's diarrhea when you eat or drink contaminated food or water  The food or water may contain bacteria, a virus, or a parasite  Water from a faucet, ice, or drinks that are not sealed can be contaminated  Foods that are prepared with tap water or not cooked properly can also be contaminated  DISCHARGE INSTRUCTIONS:   Return to the emergency department if:   · You urinate less than usual or stop urinating  · You see blood in your bowel movement  · You have severe abdominal pain  · You feel like you are going to faint  · You are too weak to stand up  · You are dizzy or lightheaded  Contact your healthcare provider if:   · Your symptoms do not get better with treatment  · Your diarrhea lasts for more than 7 days  · You have questions or concerns about your condition or care  Medicines:   · Medicines  can help decrease diarrhea or nausea, or treat an infection caused by bacteria or a parasite  · Take your medicine as directed  Contact your healthcare provider if you think your medicine is not helping or if you have side effects  Tell him of her if you are allergic to any medicine  Keep a list of the medicines, vitamins, and herbs you take  Include the amounts, and when and why you take them  Bring the list or the pill bottles to follow-up visits  Carry your medicine list with you in case of an emergency  Manage your symptoms:   · Drink liquids as directed  Liquids will help prevent dehydration caused by diarrhea  Ask your healthcare provider how much liquid to drink each day and which liquids are best for you  You may need to drink an oral rehydration solution (ORS)  An ORS has the right amounts of water, salts, and sugar you need to replace body fluids  You can buy an ORS at most grocery stores and pharmacies       · Eat foods that are easy to digest   Examples include rice, lentils, cereal, bananas, potatoes, and bread  It also includes some fruits (bananas, melon), well-cooked vegetables, and lean meats  Do not drink alcohol until your diarrhea is gone  Prevent traveler's diarrhea:   · Ask if you should take certain medicines or get vaccines before you travel  Your healthcare provider may prescribe antibiotics to prevent traveler's diarrhea  Vaccines can help protect you against bacteria or viruses that cause traveler's diarrhea  · Drink only bottled, canned, or boiled liquids when you travel  Do not put ice in your drinks  Boil water for at least 4 minutes, or use purifying tablets to treat the water  Use bottled or treated water to brush your teeth  · Do not eat raw food or dairy when you travel  Examples include fruits, raw vegetables in salads, oysters, clams, or undercooked meat  Do not have milk, ice cream, or other dairy products  Eat foods that are served hot or steaming, breads, peeled fruits and vegetables, and grilled foods  · Wash your hands often  Use bottled water and soap  Wash your hands before you eat or prepare food  Also wash your hands after you use the bathroom  Follow up with your healthcare provider as directed:  Write down your questions so you remember to ask them during your visits  © Copyright [address] Information is for End User's use only and may not be sold, redistributed or otherwise used for commercial purposes  All illustrations and images included in CareNotes® are the copyrighted property of A D A M , Inc  or [facility] [employer] [place]   The above information is an  only  It is not intended as medical advice for individual conditions or treatments  Talk to your doctor, nurse or pharmacist before following any medical regimen to see if it is safe and effective for you

## 2021-07-14 NOTE — PROGRESS NOTES
[address] [facility]        NAME: Baljinder Stein is a 49 y o  male  : 1965    MRN: [id number]  DATE: 2021  TIME: 11:42 AM    /70   Pulse 82   Temp 98 9 °F (37 2 °C)   Resp 18   Wt 84 4 kg (186 lb)   SpO2 97%   BMI 27 47 kg/m²     Assessment and Plan   Diarrhea, unspecified type [R19 7]  1  Diarrhea, unspecified type           Patient Instructions       Follow up with PCP in 3-5 days  Proceed to  ER if symptoms worsen  Chief Complaint     Chief Complaint   Patient presents with    Diarrhea     Patient c/o diarrhea x 9 days          History of Present Illness       Pt with 2 weeks of diarrhea since traveling to [facility],  Pt with full covid vaccination and negative covid test  the day after returning from [facility]       Review of Systems   Review of Systems   Constitutional: Negative  HENT: Negative  Eyes: Negative  Respiratory: Negative  Cardiovascular: Negative  Gastrointestinal: Positive for diarrhea  Negative for abdominal distention  Endocrine: Negative  Genitourinary: Negative  Musculoskeletal: Negative  Skin: Negative  Allergic/Immunologic: Negative  Neurological: Negative  Hematological: Negative  Psychiatric/Behavioral: Negative  All other systems reviewed and are negative          Current Medications       Current Outpatient Medications:     aspirin 81 mg chewable tablet, Chew 81 mg daily  , Disp: , Rfl:     atorvastatin (LIPITOR) 80 mg tablet, Take 1 tablet (80 mg total) by mouth daily, Disp: 90 tablet, Rfl: 3    Cholecalciferol (VITAMIN D PO), Take 50 Units by mouth 2 (two) times a day  , Disp: , Rfl:     Cholecalciferol 50 MCG (2000 UT) TABS, Take 50 Units by mouth, Disp: , Rfl:     Coenzyme Q10 200 MG capsule, Take 200 mg by mouth daily, Disp: , Rfl:     COVID-19 Specimen Collection KIT, As directed, Disp: , Rfl:     econazole nitrate 1 % cream, TWICE A DAY TO LEFT FOOT FOR RASH UNTIL CLEAR, Disp: , Rfl:     Current Allergies Allergies as of 07/14/2021    (No Known Allergies)            The following portions of the patient's history were reviewed and updated as appropriate: allergies, current medications, past family history, past medical history, past social history, past surgical history and problem list      Past Medical History:   Diagnosis Date    Hyperlipidemia     Myocardial infarction [facility])     cardiac stent       Past Surgical History:   Procedure Laterality Date    HIP SURGERY  2011    Trochanteric fracture requiring nail, stress fx     WISDOM TOOTH EXTRACTION         Family History   Problem Relation Age of Onset    Cancer Mother     Hypertension Mother     Coronary artery disease Father     Hyperlipidemia Father     Hypertension Brother     Colon cancer Neg Hx          Medications have been verified  Objective   /70   Pulse 82   Temp 98 9 °F (37 2 °C)   Resp 18   Wt 84 4 kg (186 lb)   SpO2 97%   BMI 27 47 kg/m²        Physical Exam     Physical Exam  Vitals and nursing note reviewed  Constitutional:       Appearance: Normal appearance  He is normal weight  Comments: Discussed with pt about stool culture and stool ova and parasite,  Pt states he has appt tomorrow with family doctor    Vitals wnl no siigns of dehydration  No abdomen pain at all    HENT:      Head: Normocephalic and atraumatic  Right Ear: Tympanic membrane, ear canal and external ear normal       Left Ear: Tympanic membrane, ear canal and external ear normal       Nose: Nose normal       Mouth/Throat:      Mouth: Mucous membranes are moist       Pharynx: Oropharynx is clear  Eyes:      Extraocular Movements: Extraocular movements intact  Conjunctiva/sclera: Conjunctivae normal       Pupils: Pupils are equal, round, and reactive to light  Cardiovascular:      Rate and Rhythm: Normal rate and regular rhythm  Pulses: Normal pulses  Heart sounds: Normal heart sounds     Pulmonary:      Effort: Pulmonary effort is normal       Breath sounds: Normal breath sounds  Abdominal:      General: Abdomen is flat  Bowel sounds are normal    Musculoskeletal:         General: Normal range of motion  Cervical back: Normal range of motion and neck supple  Skin:     General: Skin is warm  Capillary Refill: Capillary refill takes less than 2 seconds  Neurological:      General: No focal deficit present  Mental Status: He is alert and oriented to person, place, and time     Psychiatric:         Mood and Affect: Mood normal          Behavior: Behavior normal

## 2021-07-15 ENCOUNTER — APPOINTMENT (OUTPATIENT)
Dept: LAB | Facility: MEDICAL CENTER | Age: 56
End: 2021-07-15
Attending: FAMILY MEDICINE
Payer: COMMERCIAL

## 2021-07-15 DIAGNOSIS — R19.7 DIARRHEA OF PRESUMED INFECTIOUS ORIGIN: ICD-10-CM

## 2021-07-15 PROCEDURE — 87177 OVA AND PARASITES SMEARS: CPT

## 2021-07-15 PROCEDURE — 87209 SMEAR COMPLEX STAIN: CPT

## 2021-07-15 PROCEDURE — 87505 NFCT AGENT DETECTION GI: CPT

## 2021-07-15 PROCEDURE — 87205 SMEAR GRAM STAIN: CPT

## 2021-07-16 LAB
C DIFF TOX B TCDB STL QL NAA+PROBE: NEGATIVE
CAMPYLOBACTER DNA SPEC NAA+PROBE: NORMAL
G LAMBLIA AG STL QL IA: NEGATIVE
SALMONELLA DNA SPEC QL NAA+PROBE: NORMAL
SHIGA TOXIN STX GENE SPEC NAA+PROBE: NORMAL
SHIGELLA DNA SPEC QL NAA+PROBE: NORMAL
WBC STL QL MICRO: NORMAL

## 2021-07-20 LAB — O+P STL CONC: NORMAL

## 2021-09-03 ENCOUNTER — TELEPHONE (OUTPATIENT)
Dept: CARDIOLOGY CLINIC | Facility: CLINIC | Age: 56
End: 2021-09-03

## 2021-09-03 ENCOUNTER — HOSPITAL ENCOUNTER (OUTPATIENT)
Dept: NON INVASIVE DIAGNOSTICS | Facility: HOSPITAL | Age: 56
Discharge: HOME | End: 2021-09-03
Attending: INTERNAL MEDICINE
Payer: COMMERCIAL

## 2021-09-03 DIAGNOSIS — E78.5 DYSLIPIDEMIA: Primary | ICD-10-CM

## 2021-09-03 DIAGNOSIS — I25.10 CORONARY ARTERY DISEASE INVOLVING NATIVE CORONARY ARTERY OF NATIVE HEART WITHOUT ANGINA PECTORIS: ICD-10-CM

## 2021-09-03 LAB
CHEST PAIN STATEMENT: NORMAL
MAX DIASTOLIC BP: 98 MMHG
MAX HEART RATE: 166 BPM
MAX PREDICTED HEART RATE: 166 BPM
MAX. SYSTOLIC BP: 202 MMHG
PROTOCOL NAME: NORMAL
TARGET HR FORMULA: NORMAL
TEST INDICATION: NORMAL
TIME IN EXERCISE PHASE: NORMAL

## 2021-09-03 PROCEDURE — 93350 STRESS TTE ONLY: CPT

## 2021-09-03 PROCEDURE — 93351 STRESS TTE COMPLETE: CPT | Performed by: INTERNAL MEDICINE

## 2021-09-03 NOTE — TELEPHONE ENCOUNTER
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

## 2021-09-08 ENCOUNTER — APPOINTMENT (OUTPATIENT)
Dept: LAB | Facility: CLINIC | Age: 56
End: 2021-09-08
Payer: COMMERCIAL

## 2021-09-08 DIAGNOSIS — I25.10 CORONARY ARTERY DISEASE INVOLVING NATIVE CORONARY ARTERY OF NATIVE HEART WITHOUT ANGINA PECTORIS: ICD-10-CM

## 2021-09-08 DIAGNOSIS — E78.5 DYSLIPIDEMIA: ICD-10-CM

## 2021-09-08 LAB
ANION GAP SERPL CALCULATED.3IONS-SCNC: 6 MMOL/L (ref 4–13)
BUN SERPL-MCNC: 18 MG/DL (ref 5–25)
CALCIUM SERPL-MCNC: 8.8 MG/DL (ref 8.3–10.1)
CHLORIDE SERPL-SCNC: 106 MMOL/L (ref 100–108)
CHOLEST SERPL-MCNC: 139 MG/DL (ref 50–200)
CO2 SERPL-SCNC: 27 MMOL/L (ref 21–32)
CREAT SERPL-MCNC: 0.95 MG/DL (ref 0.6–1.3)
GFR SERPL CREATININE-BSD FRML MDRD: 90 ML/MIN/1.73SQ M
GLUCOSE P FAST SERPL-MCNC: 74 MG/DL (ref 65–99)
HDLC SERPL-MCNC: 55 MG/DL
LDLC SERPL CALC-MCNC: 65 MG/DL (ref 0–100)
POTASSIUM SERPL-SCNC: 3.6 MMOL/L (ref 3.5–5.3)
SODIUM SERPL-SCNC: 139 MMOL/L (ref 136–145)
TRIGL SERPL-MCNC: 95 MG/DL

## 2021-09-08 PROCEDURE — 36415 COLL VENOUS BLD VENIPUNCTURE: CPT | Performed by: INTERNAL MEDICINE

## 2021-09-08 PROCEDURE — 80061 LIPID PANEL: CPT

## 2021-09-08 PROCEDURE — 80048 BASIC METABOLIC PNL TOTAL CA: CPT | Performed by: INTERNAL MEDICINE

## 2021-09-09 ENCOUNTER — OFFICE VISIT (OUTPATIENT)
Dept: CARDIOLOGY CLINIC | Facility: CLINIC | Age: 56
End: 2021-09-09
Payer: COMMERCIAL

## 2021-09-09 VITALS
BODY MASS INDEX: 26.81 KG/M2 | HEART RATE: 76 BPM | WEIGHT: 181 LBS | HEIGHT: 69 IN | RESPIRATION RATE: 16 BRPM | DIASTOLIC BLOOD PRESSURE: 70 MMHG | SYSTOLIC BLOOD PRESSURE: 112 MMHG

## 2021-09-09 DIAGNOSIS — I10 BENIGN ESSENTIAL HTN: ICD-10-CM

## 2021-09-09 DIAGNOSIS — I25.10 CORONARY ARTERY DISEASE INVOLVING NATIVE CORONARY ARTERY OF NATIVE HEART WITHOUT ANGINA PECTORIS: Primary | ICD-10-CM

## 2021-09-09 DIAGNOSIS — E78.5 DYSLIPIDEMIA: ICD-10-CM

## 2021-09-09 PROCEDURE — 1036F TOBACCO NON-USER: CPT | Performed by: INTERNAL MEDICINE

## 2021-09-09 PROCEDURE — 3008F BODY MASS INDEX DOCD: CPT | Performed by: INTERNAL MEDICINE

## 2021-09-09 PROCEDURE — 99214 OFFICE O/P EST MOD 30 MIN: CPT | Performed by: INTERNAL MEDICINE

## 2021-09-09 NOTE — PROGRESS NOTES
Cardiology   MD Bonifacio Sousa MD Ather Mansoor, MD Silvana Berger, DO, Adair Cervantes DO, Sherry Gomes DO, [facility] - [place]  -------------------------------------------------------------------  [facility], [place] [place] [id number] [phone]                        Wilber Finch                     1965                     [id number]             Assessment/Plan:     1   Anterior wall ST-elevation myocardial infarction 6/54/2871, complicated by VF arrest, status post defibrillation x3, therapeutic hypothermia, s/p PCI/KASSANDRA to proximal LAD  2   CAD with residual 45% nonobstructive left circumflex disease (not hemodynamically significant by iFR and FFR testing as well as by nuclear stress testing)  3   Dyslipidemia, high suspicion of heterozygous familial hypercholesterolemia (LDL in 2015 up to 182 mg/dL in the setting of regular exercise and extremely healthy lifestyle, father with myocardial infarction at age 46)        · No symptoms of angina  Recent stress echocardiogram within normal limits with excellent functional capacity  Continue low-dose aspirin, high-dose atorvastatin  · Recent lipid panel reviewed, very well controlled, continue atorvastatin   · Blood pressure well controlled, remain off antihypertensives  · Continue heart healthy diet, exercise  · Will fill out [address] paperwork  Cardiac risk is low          Follow-up in 6 months        Interval History:      This is a very pleasant 61-year-old male with a history of dyslipidemia and family history of premature CAD with father having myocardial infarction at age 46  Ernesto Sanderson is a very health conscious     To review his case, on 05/20/2018 several hours into a triathlon, while running, he experienced a cardiac arrest which was witnessed   Fortunately, an AED was applied and he received 3 shocks and subsequent CPR  Ernesto Sanderson presented to [facility] at which time ECG revealed anterior ST elevations   Emergent cardiac catheterization revealed 100% occlusion of proximal LAD and he thereafter underwent PCI/KASSANDRA with 3 5 x 23 mm stent to the proximal LAD   He had 10% mid RCA disease and reported 80% mid circumflex disease  He underwent therapeutic hypothermia with subsequent mild cognitive decline and transient memory loss, which has subsequently resolved   Echocardiogram 05/23/2018 revealed slight improvement in left ventricular ejection fraction at 50%        Metoprolol and lisinopril were stopped afterward due to lightheadedness  He was maintained on aspirin, Brilinta, high-dose atorvastatin, lisinopril 10 mg daily, metoprolol tartrate 25 mg twice daily       He underwent repeat coronary angiography to address the left circumflex lesion on 06/07/2018  [employer] this was only noted to be 20% stenotic with a widely patent LAD stent, and only luminal irregularities of the mid LAD   RCA was not re-imaged       To follow [address] [place] [address]  regulations for going back to work, he had repeat coronary angiography on 08/24/2018 revealing 0% stenosis at the site of the prior proximal LAD stent, and 45% mid circumflex stenosis after the 1st obtuse marginal branch (39-45% by QCA analysis)   iFR and FFR were both not hemodynamically significant, measuring 1 0 and 0 85, respectively   His distal RCA had 20% stenosis   Ejection fraction by left ventriculography was 65%      He performed excellent on his exercise nuclear stress test on 08/22/2018 and subsequently on his treadmill exercise stress test 08/06/2019         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   This revealed 1 mm ST-segment depressions at peak exercise and very early recovery which quickly resolved  Ayanna Munoz had a very good functional capacity of 18 meds, and no evidence of dysrhythmia or chest pain   Subsequent nuclear stress test 08/25/2020 was within normal limits      Most recent lipid panel 09/08/2021 revealed LDL cholesterol 65 mg/dL, HDL 55 mg/dL, triglycerides 95 mg/dL  Stress echocardiogram 09/03/2021 performed for FAA protocol was within normal limits  His functional capacity was excellent with no ischemic ECG or echocardiographic abnormalities  He presents today for follow-up with no complaints  He denies exertional chest pain, shortness of breath, dizziness, palpitations, lower extremity edema  He remains active without any issues  Vitals:  Vitals:    09/09/21 1322   BP: 112/70   Pulse: 76   Resp: 16   Weight: 82 1 kg (181 lb)   Height: 5' 9" (1 753 m)         Past Medical History:   Diagnosis Date    Hyperlipidemia     Myocardial infarction (HCC)     cardiac stent     Social History     Socioeconomic History    Marital status: /Civil Union     Spouse name: Not on file    Number of children: Not on file    Years of education: Not on file    Highest education level: Not on file   Occupational History    Occupation: Devotee     Employer: [employer]     Comment: Full-time   Tobacco Use    Smoking status: Never Smoker    Smokeless tobacco: Never Used    Tobacco comment: No tobacco/smoke exposure   Substance and Sexual Activity    Alcohol use: Yes     Alcohol/week: 7 0 - 10 0 standard drinks     Types: 7 - 10 Cans of beer per week     Comment: Social alcohol use    Drug use: No    Sexual activity: Not on file   Other Topics Concern    Not on file   Social History Narrative    Exercises regularly     Social Determinants of Health     Financial Resource Strain:     Difficulty of Paying Living Expenses:    Food Insecurity:     Worried About Running Out of Food in the Last Year:     [address] in the Last Year:    Transportation Needs:     Lack of Transportation (Medical):      Lack of Transportation (Non-Medical):    Physical Activity:     Days of Exercise per Week:     Minutes of Exercise per Session:    Stress:     Feeling of Stress :    Social Connections:     Frequency of Communication with Friends and Family:     Frequency of Social Gatherings with Friends and Family:     Attends Worship Services:     Active Member of Clubs or Organizations:     Attends Club or Organization Meetings:     Marital Status:    Intimate Partner Violence:     Fear of Current or Ex-Partner:     Emotionally Abused:     Physically Abused:     Sexually Abused:       Family History   Problem Relation Age of Onset    Cancer Mother     Hypertension Mother     Coronary artery disease Father     Hyperlipidemia Father     Hypertension Brother     Colon cancer Neg Hx      Past Surgical History:   Procedure Laterality Date    HIP SURGERY  2011    Trochanteric fracture requiring nail, stress fx     WISDOM TOOTH EXTRACTION         Current Outpatient Medications:     atorvastatin (LIPITOR) 80 mg tablet, Take 1 tablet (80 mg total) by mouth daily, Disp: 90 tablet, Rfl: 3    Cholecalciferol (VITAMIN D PO), Take 50 Units by mouth 2 (two) times a day  , Disp: , Rfl:     Coenzyme Q10 200 MG capsule, Take 200 mg by mouth daily, Disp: , Rfl:     COVID-19 Specimen Collection KIT, As directed, Disp: , Rfl:     econazole nitrate 1 % cream, TWICE A DAY TO LEFT FOOT FOR RASH UNTIL CLEAR, Disp: , Rfl:     aspirin 81 mg chewable tablet, Chew 81 mg daily  , Disp: , Rfl:         Review of Systems:  Review of Systems   Respiratory: Negative  Cardiovascular: Negative  All other systems reviewed and are negative  Physical Exam:  Physical Exam  Constitutional:       General: He is not in acute distress  Appearance: He is well-developed  He is not diaphoretic  HENT:      Head: Normocephalic and atraumatic  Eyes:      General: No scleral icterus  Right eye: No discharge  Pupils: Pupils are equal, round, and reactive to light  Neck:      Thyroid: No thyromegaly  Cardiovascular:      Rate and Rhythm: Normal rate and regular rhythm        Heart sounds: Normal heart sounds  No murmur heard  No friction rub  No gallop  Pulmonary:      Effort: Pulmonary effort is normal       Breath sounds: Normal breath sounds  Abdominal:      General: There is no distension  Tenderness: There is no abdominal tenderness  There is no guarding or rebound  Musculoskeletal:         General: Normal range of motion  Cervical back: Normal range of motion and neck supple  Skin:     General: Skin is warm and dry  Coloration: Skin is not pale  Findings: No erythema or rash  Neurological:      Mental Status: He is alert and oriented to person, place, and time  Coordination: Coordination normal    Psychiatric:         Behavior: Behavior normal          Thought Content: Thought content normal          Judgment: Judgment normal          This note was completed in part utilizing [employer] Software  Grammatical errors, random word insertions, spelling mistakes, and incomplete sentences can be an occasional consequence of this system secondary to software limitations, ambient noise, and hardware issues  If you have any questions or concerns about the content, text, or information contained within the body of this dictation, please contact the provider for clarification

## 2021-10-18 ENCOUNTER — TELEMEDICINE (OUTPATIENT)
Dept: FAMILY MEDICINE CLINIC | Facility: CLINIC | Age: 56
End: 2021-10-18
Payer: COMMERCIAL

## 2021-10-18 DIAGNOSIS — B34.9 VIRAL INFECTION, UNSPECIFIED: Primary | ICD-10-CM

## 2021-10-18 PROCEDURE — U0003 INFECTIOUS AGENT DETECTION BY NUCLEIC ACID (DNA OR RNA); SEVERE ACUTE RESPIRATORY SYNDROME CORONAVIRUS 2 (SARS-COV-2) (CORONAVIRUS DISEASE [COVID-19]), AMPLIFIED PROBE TECHNIQUE, MAKING USE OF HIGH THROUGHPUT TECHNOLOGIES AS DESCRIBED BY CMS-2020-01-R: HCPCS | Performed by: INTERNAL MEDICINE

## 2021-10-18 PROCEDURE — U0005 INFEC AGEN DETEC AMPLI PROBE: HCPCS | Performed by: INTERNAL MEDICINE

## 2021-10-18 PROCEDURE — 99441 PR PHYS/QHP TELEPHONE EVALUATION 5-10 MIN: CPT | Performed by: INTERNAL MEDICINE

## 2021-10-22 ENCOUNTER — TELEMEDICINE (OUTPATIENT)
Dept: FAMILY MEDICINE CLINIC | Facility: CLINIC | Age: 56
End: 2021-10-22
Payer: COMMERCIAL

## 2021-10-22 DIAGNOSIS — J02.9 SORE THROAT: Primary | ICD-10-CM

## 2021-10-22 PROCEDURE — 99441 PR PHYS/QHP TELEPHONE EVALUATION 5-10 MIN: CPT | Performed by: INTERNAL MEDICINE

## 2021-11-18 ENCOUNTER — OFFICE VISIT (OUTPATIENT)
Dept: FAMILY MEDICINE CLINIC | Facility: CLINIC | Age: 56
End: 2021-11-18

## 2021-11-18 VITALS
SYSTOLIC BLOOD PRESSURE: 130 MMHG | HEIGHT: 69 IN | BODY MASS INDEX: 27.25 KG/M2 | WEIGHT: 184 LBS | HEART RATE: 66 BPM | DIASTOLIC BLOOD PRESSURE: 80 MMHG

## 2021-11-18 DIAGNOSIS — Z02.89 ENCOUNTER FOR FEDERAL AVIATION ADMINISTRATION (FAA) EXAMINATION: Primary | ICD-10-CM

## 2021-11-18 PROCEDURE — 99499 UNLISTED E&M SERVICE: CPT | Performed by: FAMILY MEDICINE

## 2021-12-14 DIAGNOSIS — I25.10 CORONARY ARTERY DISEASE INVOLVING NATIVE HEART: ICD-10-CM

## 2021-12-14 DIAGNOSIS — E78.5 DYSLIPIDEMIA: ICD-10-CM

## 2021-12-15 RX ORDER — ATORVASTATIN CALCIUM 80 MG/1
TABLET, FILM COATED ORAL
Qty: 90 TABLET | Refills: 3 | Status: SHIPPED | OUTPATIENT
Start: 2021-12-15

## 2022-05-09 ENCOUNTER — OFFICE VISIT (OUTPATIENT)
Dept: FAMILY MEDICINE CLINIC | Facility: CLINIC | Age: 57
End: 2022-05-09

## 2022-05-09 VITALS
BODY MASS INDEX: 26.34 KG/M2 | HEART RATE: 54 BPM | TEMPERATURE: 97.7 F | DIASTOLIC BLOOD PRESSURE: 90 MMHG | WEIGHT: 184 LBS | SYSTOLIC BLOOD PRESSURE: 130 MMHG | HEIGHT: 70 IN

## 2022-05-09 DIAGNOSIS — Z02.89 ENCOUNTER FOR FEDERAL AVIATION ADMINISTRATION (FAA) EXAMINATION: Primary | ICD-10-CM

## 2022-05-09 PROCEDURE — 99499 UNLISTED E&M SERVICE: CPT | Performed by: FAMILY MEDICINE

## 2022-05-09 PROCEDURE — 93000 ELECTROCARDIOGRAM COMPLETE: CPT | Performed by: FAMILY MEDICINE

## 2022-07-20 DIAGNOSIS — I25.10 CORONARY ARTERY DISEASE INVOLVING NATIVE CORONARY ARTERY OF NATIVE HEART WITHOUT ANGINA PECTORIS: Primary | ICD-10-CM

## 2022-08-29 ENCOUNTER — HOSPITAL ENCOUNTER (OUTPATIENT)
Dept: NON INVASIVE DIAGNOSTICS | Facility: HOSPITAL | Age: 57
Discharge: HOME | End: 2022-08-29
Attending: INTERNAL MEDICINE
Payer: COMMERCIAL

## 2022-08-29 ENCOUNTER — HOSPITAL ENCOUNTER (OUTPATIENT)
Dept: NUCLEAR MEDICINE | Facility: HOSPITAL | Age: 57
Discharge: HOME | End: 2022-08-29
Attending: INTERNAL MEDICINE
Payer: COMMERCIAL

## 2022-08-29 VITALS
HEIGHT: 70 IN | OXYGEN SATURATION: 98 % | DIASTOLIC BLOOD PRESSURE: 82 MMHG | SYSTOLIC BLOOD PRESSURE: 120 MMHG | HEART RATE: 57 BPM | BODY MASS INDEX: 26.34 KG/M2 | WEIGHT: 184 LBS

## 2022-08-29 DIAGNOSIS — I25.83 CORONARY ARTERY DISEASE DUE TO LIPID RICH PLAQUE: Primary | ICD-10-CM

## 2022-08-29 DIAGNOSIS — I25.10 CORONARY ARTERY DISEASE DUE TO LIPID RICH PLAQUE: Primary | ICD-10-CM

## 2022-08-29 DIAGNOSIS — I25.10 CORONARY ARTERY DISEASE INVOLVING NATIVE CORONARY ARTERY OF NATIVE HEART WITHOUT ANGINA PECTORIS: ICD-10-CM

## 2022-08-29 LAB
BASELINE ST DEPRESSION: 0 MM
CHEST PAIN STATEMENT: NORMAL
MAX DIASTOLIC BP: 70 MMHG
MAX HEART RATE: 166 BPM
MAX HR PERCENT: 100 %
MAX HR: 166 BPM
MAX PREDICTED HEART RATE: 165 BPM
MAX. SYSTOLIC BP: 194 MMHG
NUC STRESS EJECTION FRACTION: 60 %
PROTOCOL NAME: NORMAL
RATE PRESSURE PRODUCT: NORMAL
SL CV REST NUCLEAR ISOTOPE DOSE: 10.8 MCI
SL CV STRESS NUCLEAR ISOTOPE DOSE: 31.5 MCI
SL CV STRESS RECOVERY BP: NORMAL MMHG
SL CV STRESS RECOVERY HR: 86 BPM
SL CV STRESS RECOVERY O2 SAT: 99 %
SL CV STRESS STAGE REACHED: 6
STRESS ANGINA INDEX: 0
STRESS BASELINE BP: NORMAL MMHG
STRESS BASELINE HR: 57 BPM
STRESS O2 SAT REST: 98 %
STRESS PEAK HR: 166 BPM
STRESS PERCENT HR: 100 %
STRESS POST ESTIMATED WORKLOAD: 20.3 METS
STRESS POST EXERCISE DUR MIN: 18 MIN
STRESS POST EXERCISE DUR SEC: 3 SEC
STRESS POST O2 SAT PEAK: 98 %
STRESS POST PEAK BP: 194 MMHG
STRESS TARGET HR: 166 BPM
STRESS/REST PERFUSION RATIO: 1
TARGET HR FORMULA: NORMAL
TEST INDICATION: NORMAL
TIME IN EXERCISE PHASE: NORMAL

## 2022-08-29 PROCEDURE — G1004 CDSM NDSC: HCPCS

## 2022-08-29 PROCEDURE — 78452 HT MUSCLE IMAGE SPECT MULT: CPT | Performed by: INTERNAL MEDICINE

## 2022-08-29 PROCEDURE — 93018 CV STRESS TEST I&R ONLY: CPT | Performed by: INTERNAL MEDICINE

## 2022-08-29 PROCEDURE — A9502 TC99M TETROFOSMIN: HCPCS

## 2022-08-29 PROCEDURE — 93017 CV STRESS TEST TRACING ONLY: CPT

## 2022-08-29 PROCEDURE — 78452 HT MUSCLE IMAGE SPECT MULT: CPT

## 2022-08-29 PROCEDURE — 93016 CV STRESS TEST SUPVJ ONLY: CPT | Performed by: INTERNAL MEDICINE

## 2022-09-01 ENCOUNTER — APPOINTMENT (OUTPATIENT)
Dept: LAB | Facility: CLINIC | Age: 57
End: 2022-09-01
Payer: COMMERCIAL

## 2022-09-01 DIAGNOSIS — I25.10 CORONARY ARTERY DISEASE DUE TO LIPID RICH PLAQUE: ICD-10-CM

## 2022-09-01 DIAGNOSIS — I25.83 CORONARY ARTERY DISEASE DUE TO LIPID RICH PLAQUE: ICD-10-CM

## 2022-09-01 LAB
ALBUMIN SERPL BCP-MCNC: 4 G/DL (ref 3.5–5)
ALP SERPL-CCNC: 81 U/L (ref 46–116)
ALT SERPL W P-5'-P-CCNC: 74 U/L (ref 12–78)
ANION GAP SERPL CALCULATED.3IONS-SCNC: 4 MMOL/L (ref 4–13)
AST SERPL W P-5'-P-CCNC: 33 U/L (ref 5–45)
BILIRUB SERPL-MCNC: 1.11 MG/DL (ref 0.2–1)
BUN SERPL-MCNC: 13 MG/DL (ref 5–25)
CALCIUM SERPL-MCNC: 9.5 MG/DL (ref 8.3–10.1)
CHLORIDE SERPL-SCNC: 105 MMOL/L (ref 96–108)
CHOLEST SERPL-MCNC: 146 MG/DL
CO2 SERPL-SCNC: 29 MMOL/L (ref 21–32)
CREAT SERPL-MCNC: 1.06 MG/DL (ref 0.6–1.3)
GFR SERPL CREATININE-BSD FRML MDRD: 78 ML/MIN/1.73SQ M
GLUCOSE P FAST SERPL-MCNC: 89 MG/DL (ref 65–99)
HDLC SERPL-MCNC: 49 MG/DL
LDLC SERPL CALC-MCNC: 74 MG/DL (ref 0–100)
POTASSIUM SERPL-SCNC: 4.4 MMOL/L (ref 3.5–5.3)
PROT SERPL-MCNC: 7.6 G/DL (ref 6.4–8.4)
SODIUM SERPL-SCNC: 138 MMOL/L (ref 135–147)
TRIGL SERPL-MCNC: 115 MG/DL

## 2022-09-01 PROCEDURE — 80061 LIPID PANEL: CPT

## 2022-09-01 PROCEDURE — 80053 COMPREHEN METABOLIC PANEL: CPT | Performed by: INTERNAL MEDICINE

## 2022-09-01 PROCEDURE — 36415 COLL VENOUS BLD VENIPUNCTURE: CPT | Performed by: INTERNAL MEDICINE

## 2022-09-13 ENCOUNTER — OFFICE VISIT (OUTPATIENT)
Dept: CARDIOLOGY CLINIC | Facility: CLINIC | Age: 57
End: 2022-09-13
Payer: COMMERCIAL

## 2022-09-13 VITALS
SYSTOLIC BLOOD PRESSURE: 105 MMHG | HEART RATE: 75 BPM | BODY MASS INDEX: 25.91 KG/M2 | DIASTOLIC BLOOD PRESSURE: 80 MMHG | WEIGHT: 180.6 LBS

## 2022-09-13 DIAGNOSIS — E78.5 DYSLIPIDEMIA: ICD-10-CM

## 2022-09-13 DIAGNOSIS — I25.10 CORONARY ARTERY DISEASE INVOLVING NATIVE CORONARY ARTERY OF NATIVE HEART WITHOUT ANGINA PECTORIS: Primary | ICD-10-CM

## 2022-09-13 PROCEDURE — 99214 OFFICE O/P EST MOD 30 MIN: CPT | Performed by: INTERNAL MEDICINE

## 2022-09-13 NOTE — PROGRESS NOTES
Cardiology   MD Mejia Betancourt MD Ather Mansoor, MD Camilo Sinning, DO, Radha Lopez DO, Micah Pineda DO, [facility] [place]  -------------------------------------------------------------------  [facility], [facility] [address] [address] [phone]                        Nicholas Ordonez                     1965                     [id number]             Assessment/Plan:     1   Anterior wall ST-elevation myocardial infarction 9/38/8212, complicated by VF arrest, status post defibrillation x3, therapeutic hypothermia, s/p PCI/KASSANDRA to proximal LAD  2   CAD with residual 45% nonobstructive left circumflex disease (not hemodynamically significant by iFR and FFR testing as well as by nuclear stress testing)  3   Dyslipidemia, high suspicion of heterozygous familial hypercholesterolemia (LDL in 2015 up to 182 mg/dL in the setting of regular exercise and extremely healthy lifestyle, father with myocardial infarction at age 46)        No symptoms of angina  Recent nuclear stress test within normal limits with excellent functional capacity  Continue low-dose aspirin high-dose atorvastatin  Prior lipid panel reviewed, controlled, continue atorvastatin  FAA letter has been dictated  The patient remains at low cardiovascular risk  Try to 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 to avoid repetitive radiation exposure with nuclear stress testing           Follow-up in 1 year          Interval History:      This is a very pleasant 53-year-old male with a history of dyslipidemia and family history of premature CAD with father having myocardial infarction at age 46  Tj Talbert is a very health conscious     To review his case, on 05/20/2018 several hours into a triathlon, while running, he experienced a cardiac arrest which was witnessed   Fortunately, an AED was applied and he received 3 shocks and subsequent CPR  Tj Talbert presented to [address] [place] [address] at which time ECG revealed anterior ST elevations   Emergent cardiac catheterization revealed 100% occlusion of proximal LAD and he thereafter underwent PCI/KASSANDRA with 3 5 x 23 mm stent to the proximal LAD   He had 10% mid RCA disease and reported 80% mid circumflex disease  He underwent therapeutic hypothermia with subsequent mild cognitive decline and transient memory loss, which has subsequently resolved   Echocardiogram 05/23/2018 revealed slight improvement in left ventricular ejection fraction at 50%        Metoprolol and lisinopril were stopped afterward due to lightheadedness  He was maintained on aspirin, Brilinta, high-dose atorvastatin, lisinopril 10 mg daily, metoprolol tartrate 25 mg twice daily       He underwent repeat coronary angiography to address the left circumflex lesion on 06/07/2018  [facility] this was only noted to be 20% stenotic with a widely patent LAD stent, and only luminal irregularities of the mid LAD   RCA was not re-imaged       To follow [address] [place] [address] Dr wing for going back to work, he had repeat coronary angiography on 08/24/2018 revealing 0% stenosis at the site of the prior proximal LAD stent, and 45% mid circumflex stenosis after the 1st obtuse marginal branch (39-45% by QCA analysis)   iFR and FFR were both not hemodynamically significant, measuring 1 0 and 0 85, respectively   His distal RCA had 20% stenosis   Ejection fraction by left ventriculography was 65%      He performed excellent on his exercise nuclear stress test on 08/22/2018 and subsequently on his treadmill exercise stress test 08/06/2019         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   This revealed 1 mm ST-segment depressions at peak exercise and very early recovery which quickly resolved  Tj Talbert had a very good functional capacity of 18 meds, and no evidence of dysrhythmia or chest pain   Subsequent nuclear stress test 08/25/2020 was within normal limits      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 was within normal limits  Most recent lipid panel 09/01/2022 demonstrated LDL cholesterol 74 mg/dL, triglycerides 115 mg/dL  He presents today for follow-up with no complaints  He feels excellent  He swims on a regular basis and competes in swim races as well  He denies chest discomfort, shortness of breath, dizziness, palpitations, lower extremity edema  Vitals:  Vitals:    09/13/22 1034   BP: 105/80   BP Location: Right arm   Patient Position: Sitting   Cuff Size: Standard   Pulse: 75   Weight: 81 9 kg (180 lb 9 6 oz)         Past Medical History:   Diagnosis Date    Hyperlipidemia     Myocardial infarction (HCC)     cardiac stent     Social History     Socioeconomic History    Marital status: /Civil Union     Spouse name: Not on file    Number of children: Not on file    Years of education: Not on file    Highest education level: Not on file   Occupational History    Occupation: [place]     Employer: [employer]     Comment: Full-time   Tobacco Use    Smoking status: Never Smoker    Smokeless tobacco: Never Used    Tobacco comment: No tobacco/smoke exposure   Substance and Sexual Activity    Alcohol use:  Yes     Alcohol/week: 7 0 - 10 0 standard drinks     Types: 7 - 10 Cans of beer per week     Comment: Social alcohol use    Drug use: No    Sexual activity: Not on file   Other Topics Concern    Not on file   Social History Narrative    Exercises regularly     Social Determinants of Health     Financial Resource Strain: Not on file   Food Insecurity: Not on file   Transportation Needs: Not on file   Physical Activity: Not on file   Stress: Not on file   Social Connections: Not on file   Intimate Partner Violence: Not on file   Housing Stability: Not on file      Family History   Problem Relation Age of Onset    Cancer Mother     Hypertension Mother     Coronary artery disease Father     Hyperlipidemia Father     Hypertension Brother     Colon cancer Neg Hx      Past Surgical History:   Procedure Laterality Date    HIP SURGERY  2011    Trochanteric fracture requiring nail, stress fx     WISDOM TOOTH EXTRACTION         Current Outpatient Medications:     aspirin 81 mg chewable tablet, Chew 81 mg daily  , Disp: , Rfl:     atorvastatin (LIPITOR) 80 mg tablet, TAKE 1 TABLET BY MOUTH EVERY DAY, Disp: 90 tablet, Rfl: 3    Cholecalciferol (VITAMIN D PO), Take 50 Units by mouth 2 (two) times a day  , Disp: , Rfl:     Coenzyme Q10 200 MG capsule, Take 200 mg by mouth daily, Disp: , Rfl:     COVID-19 Specimen Collection KIT, As directed, Disp: , Rfl:     econazole nitrate 1 % cream, TWICE A DAY TO LEFT FOOT FOR RASH UNTIL CLEAR, Disp: , Rfl:         Review of Systems:  Review of Systems   Respiratory: Negative  Negative for chest tightness  Cardiovascular: Negative  All other systems reviewed and are negative  Physical Exam:  Physical Exam  Constitutional:       General: He is not in acute distress  Appearance: He is well-developed  He is not diaphoretic  HENT:      Head: Normocephalic and atraumatic  Eyes:      General: No scleral icterus  Right eye: No discharge  Pupils: Pupils are equal, round, and reactive to light  Neck:      Thyroid: No thyromegaly  Cardiovascular:      Rate and Rhythm: Normal rate and regular rhythm  Heart sounds: Normal heart sounds  No murmur heard  No friction rub  No gallop  Pulmonary:      Effort: Pulmonary effort is normal       Breath sounds: Normal breath sounds  Abdominal:      General: There is no distension  Tenderness: There is no abdominal tenderness  There is no guarding or rebound  Musculoskeletal:         General: Normal range of motion  Cervical back: Normal range of motion and neck supple  Right lower leg: No edema  Left lower leg: No edema     Skin:     General: Skin is warm and dry  Coloration: Skin is not pale  Findings: No erythema or rash  Neurological:      Mental Status: He is alert and oriented to person, place, and time  Coordination: Coordination normal    Psychiatric:         Behavior: Behavior normal          Thought Content: Thought content normal          Judgment: Judgment normal          This note was completed in part utilizing [employer] Software  Grammatical errors, random word insertions, spelling mistakes, and incomplete sentences can be an occasional consequence of this system secondary to software limitations, ambient noise, and hardware issues  If you have any questions or concerns about the content, text, or information contained within the body of this dictation, please contact the provider for clarification

## 2022-09-15 ENCOUNTER — OFFICE VISIT (OUTPATIENT)
Dept: UROLOGY | Facility: MEDICAL CENTER | Age: 57
End: 2022-09-15
Payer: COMMERCIAL

## 2022-09-15 VITALS
HEART RATE: 80 BPM | WEIGHT: 179.4 LBS | OXYGEN SATURATION: 96 % | BODY MASS INDEX: 25.68 KG/M2 | HEIGHT: 70 IN | DIASTOLIC BLOOD PRESSURE: 82 MMHG | SYSTOLIC BLOOD PRESSURE: 110 MMHG

## 2022-09-15 DIAGNOSIS — N45.1 EPIDIDYMITIS: Primary | ICD-10-CM

## 2022-09-15 DIAGNOSIS — N40.0 BENIGN PROSTATIC HYPERPLASIA WITHOUT LOWER URINARY TRACT SYMPTOMS: ICD-10-CM

## 2022-09-15 PROCEDURE — 99203 OFFICE O/P NEW LOW 30 MIN: CPT | Performed by: UROLOGY

## 2022-09-15 NOTE — LETTER
September 16, 2022     Chetna Kim MD    Patient: Olga Smith   YOB: 1965   Date of Visit: 9/15/2022       Dear Dr Catherine Jeter: Thank you for referring Kenny Arambula to me for evaluation  Below are my notes for this consultation  If you have questions, please do not hesitate to call me  I look forward to following your patient along with you  Sincerely,        Luzmaria Cornejo MD        CC: No Recipients  Luzmaria Cornejo MD  9/16/2022  3:06 PM  Sign when Signing Visit     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  He thinks perhaps it helped a slight amount  The discomfort became a bit more noticeable the past few weeks anyone to be checked    No urinary symptoms whatsoever  Episode of right epididymitis evaluated by us 6-8 years ago  Vasectomy long ago         ASSESSMENT / PLAN:    1  Exam shows very minimal tenderness to upper pole left epididymis, we discussed if further antibiotics would help, and he is not eager to take anything  I agree it is not necessary  2  He is mainly glad to hear there is no suggestion of cancer  3  We will do ultrasound for reassurance  And check PSA  4  He will take a 5-10 day course of anti-inflammatory if pain because more significant, he says he does not really think he will need that  The following portions of the patient's history were reviewed and updated as appropriate: allergies, current medications, past family history, past medical history, past social history, past surgical history and problem list     Review of Systems   All other systems reviewed and are negative  Objective:     Physical Exam  Constitutional:       General: He is not in acute distress  Appearance: He is well-developed   He is not diaphoretic  HENT:      Head: Normocephalic and atraumatic  Eyes:      General: No scleral icterus  Pulmonary:      Effort: Pulmonary effort is normal    Genitourinary:     Comments: Penis testes normal  Very minimal tenderness to upper pole left epididymis, not really swollen  Right upper pole epididymis is swollen, nontender    Prostate minimally enlarged no nodules  Skin:     Coloration: Skin is not pale  Neurological:      Mental Status: He is alert and oriented to person, place, and time  Psychiatric:         Behavior: Behavior normal          Thought Content: Thought content normal          Judgment: Judgment normal            0   Lab Value Date/Time    PSA 2 6 07/06/2018 0815   ]  BUN   Date Value Ref Range Status   09/01/2022 13 5 - 25 mg/dL Final   08/06/2019 16 7 - 25 mg/dL Final     Creatinine   Date Value Ref Range Status   09/01/2022 1 06 0 60 - 1 30 mg/dL Final     Comment:     Standardized to IDMS reference method     No components found for: CBC      Patient Active Problem List   Diagnosis    Vitamin D deficiency    Hypercholesterolemia    Coronary atherosclerosis    Atypical nevi    Microscopic hematuria    Atherosclerotic heart disease of native coronary artery without angina pectoris    Encephalopathy acute    [facility]I [facility] elevation myocardial infarction) ([facility] [address] )    History of sudden cardiac arrest successfully resuscitated    Encounter for [facility]) examination    Benign prostatic hyperplasia without lower urinary tract symptoms    Epididymitis        Diagnoses and all orders for this visit:    Epididymitis  -     US scrotum and testicles; Future    Benign prostatic hyperplasia without lower urinary tract symptoms  -     PSA Total, Diagnostic; Future           Patient ID: Kenzie Perez is a 56 y o  male        Current Outpatient Medications:     aspirin 81 mg chewable tablet, Chew 81 mg daily  , Disp: , Rfl:     atorvastatin (LIPITOR) 80 mg tablet, TAKE 1 TABLET BY MOUTH EVERY DAY, Disp: 90 tablet, Rfl: 3    Cholecalciferol (VITAMIN D PO), Take 50 Units by mouth 2 (two) times a day  , Disp: , Rfl:     Coenzyme Q10 200 MG capsule, Take 200 mg by mouth daily, Disp: , Rfl:     Past Medical History:   Diagnosis Date    Hyperlipidemia     Myocardial infarction [facility])     cardiac stent       Past Surgical History:   Procedure Laterality Date    HIP SURGERY  2011    Trochanteric fracture requiring nail, stress fx     WISDOM TOOTH EXTRACTION         Social History

## 2022-09-16 NOTE — PROGRESS NOTES
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  He thinks perhaps it helped a slight amount  The discomfort became a bit more noticeable the past few weeks anyone to be checked    No urinary symptoms whatsoever  Episode of right epididymitis evaluated by us 6-8 years ago  Vasectomy long ago         ASSESSMENT / PLAN:    1  Exam shows very minimal tenderness to upper pole left epididymis, we discussed if further antibiotics would help, and he is not eager to take anything  I agree it is not necessary  2  He is mainly glad to hear there is no suggestion of cancer  3  We will do ultrasound for reassurance  And check PSA  4  He will take a 5-10 day course of anti-inflammatory if pain because more significant, he says he does not really think he will need that  The following portions of the patient's history were reviewed and updated as appropriate: allergies, current medications, past family history, past medical history, past social history, past surgical history and problem list     Review of Systems   All other systems reviewed and are negative  Objective:     Physical Exam  Constitutional:       General: He is not in acute distress  Appearance: He is well-developed  He is not diaphoretic  HENT:      Head: Normocephalic and atraumatic  Eyes:      General: No scleral icterus  Pulmonary:      Effort: Pulmonary effort is normal    Genitourinary:     Comments: Penis testes normal  Very minimal tenderness to upper pole left epididymis, not really swollen  Right upper pole epididymis is swollen, nontender    Prostate minimally enlarged no nodules  Skin:     Coloration: Skin is not pale     Neurological:      Mental Status: He is alert and oriented to person, place, and time  Psychiatric:         Behavior: Behavior normal          Thought Content: Thought content normal          Judgment: Judgment normal            0   Lab Value Date/Time    PSA 2 6 07/06/2018 0815   ]  BUN   Date Value Ref Range Status   09/01/2022 13 5 - 25 mg/dL Final   08/06/2019 16 7 - 25 mg/dL Final     Creatinine   Date Value Ref Range Status   09/01/2022 1 06 0 60 - 1 30 mg/dL Final     Comment:     Standardized to IDMS reference method     No components found for: CBC      Patient Active Problem List   Diagnosis    Vitamin D deficiency    Hypercholesterolemia    Coronary atherosclerosis    Atypical nevi    Microscopic hematuria    Atherosclerotic heart disease of native coronary artery without angina pectoris    Encephalopathy acute    STEMI (ST elevation myocardial infarction) ([facility] [address] )    History of sudden cardiac arrest successfully resuscitated    Encounter for [facility]) examination    Benign prostatic hyperplasia without lower urinary tract symptoms    Epididymitis        Diagnoses and all orders for this visit:    Epididymitis  -     US scrotum and testicles; Future    Benign prostatic hyperplasia without lower urinary tract symptoms  -     PSA Total, Diagnostic; Future           Patient ID: Burgess Nolasco is a 56 y o  male        Current Outpatient Medications:     aspirin 81 mg chewable tablet, Chew 81 mg daily  , Disp: , Rfl:     atorvastatin (LIPITOR) 80 mg tablet, TAKE 1 TABLET BY MOUTH EVERY DAY, Disp: 90 tablet, Rfl: 3    Cholecalciferol (VITAMIN D PO), Take 50 Units by mouth 2 (two) times a day  , Disp: , Rfl:     Coenzyme Q10 200 MG capsule, Take 200 mg by mouth daily, Disp: , Rfl:     Past Medical History:   Diagnosis Date    Hyperlipidemia     Myocardial infarction [facility])     cardiac stent       Past Surgical History:   Procedure Laterality Date    HIP SURGERY  2011    Trochanteric fracture requiring nail, stress fx     WISDOM TOOTH EXTRACTION         Social History

## 2022-10-12 ENCOUNTER — APPOINTMENT (OUTPATIENT)
Dept: LAB | Facility: CLINIC | Age: 57
End: 2022-10-12
Payer: COMMERCIAL

## 2022-10-12 DIAGNOSIS — N40.0 BENIGN PROSTATIC HYPERPLASIA WITHOUT LOWER URINARY TRACT SYMPTOMS: ICD-10-CM

## 2022-10-12 LAB — PSA SERPL-MCNC: 2.3 NG/ML (ref 0–4)

## 2022-10-12 PROCEDURE — 84153 ASSAY OF PSA TOTAL: CPT

## 2022-10-20 ENCOUNTER — HOSPITAL ENCOUNTER (OUTPATIENT)
Dept: ULTRASOUND IMAGING | Facility: HOSPITAL | Age: 57
Discharge: HOME | End: 2022-10-20
Attending: UROLOGY
Payer: COMMERCIAL

## 2022-10-20 DIAGNOSIS — N45.1 EPIDIDYMITIS: ICD-10-CM

## 2022-10-20 PROCEDURE — 76870 US EXAM SCROTUM: CPT

## 2022-11-14 ENCOUNTER — OFFICE VISIT (OUTPATIENT)
Dept: FAMILY MEDICINE CLINIC | Facility: CLINIC | Age: 57
End: 2022-11-14

## 2022-11-14 VITALS
HEART RATE: 70 BPM | DIASTOLIC BLOOD PRESSURE: 80 MMHG | SYSTOLIC BLOOD PRESSURE: 120 MMHG | HEIGHT: 70 IN | WEIGHT: 185 LBS | BODY MASS INDEX: 26.48 KG/M2

## 2022-11-14 DIAGNOSIS — Z02.89 ENCOUNTER FOR FEDERAL AVIATION ADMINISTRATION (FAA) EXAMINATION: Primary | ICD-10-CM

## 2022-12-04 ENCOUNTER — PATIENT MESSAGE (OUTPATIENT)
Dept: FAMILY MEDICINE CLINIC | Facility: CLINIC | Age: 57
End: 2022-12-04

## 2022-12-08 ENCOUNTER — TELEPHONE (OUTPATIENT)
Dept: CARDIOLOGY CLINIC | Facility: CLINIC | Age: 57
End: 2022-12-08

## 2022-12-08 DIAGNOSIS — E78.5 DYSLIPIDEMIA: ICD-10-CM

## 2022-12-08 DIAGNOSIS — I25.10 CORONARY ARTERY DISEASE INVOLVING NATIVE HEART: ICD-10-CM

## 2022-12-08 RX ORDER — ATORVASTATIN CALCIUM 80 MG/1
TABLET, FILM COATED ORAL
Qty: 90 TABLET | Refills: 3 | Status: SHIPPED | OUTPATIENT
Start: 2022-12-08

## 2022-12-08 NOTE — TELEPHONE ENCOUNTER
Pt calling stating stress test information was never received by [address] for his clearance   patient given phone number to reach out to Cardiology dept at hospital to obtain copies of needed papers

## 2023-02-14 ENCOUNTER — TELEPHONE (OUTPATIENT)
Dept: UROLOGY | Facility: MEDICAL CENTER | Age: 58
End: 2023-02-14

## 2023-02-15 ENCOUNTER — OFFICE VISIT (OUTPATIENT)
Dept: UROLOGY | Facility: MEDICAL CENTER | Age: 58
End: 2023-02-15

## 2023-02-15 VITALS
OXYGEN SATURATION: 97 % | DIASTOLIC BLOOD PRESSURE: 80 MMHG | WEIGHT: 185 LBS | HEIGHT: 70 IN | HEART RATE: 54 BPM | BODY MASS INDEX: 26.48 KG/M2 | SYSTOLIC BLOOD PRESSURE: 118 MMHG

## 2023-02-15 DIAGNOSIS — R10.30 DISCOMFORT OF GROIN, UNSPECIFIED LATERALITY: Primary | ICD-10-CM

## 2023-02-15 LAB
SL AMB  POCT GLUCOSE, UA: NORMAL
SL AMB LEUKOCYTE ESTERASE,UA: NORMAL
SL AMB POCT BILIRUBIN,UA: NORMAL
SL AMB POCT BLOOD,UA: NORMAL
SL AMB POCT CLARITY,UA: CLEAR
SL AMB POCT COLOR,UA: YELLOW
SL AMB POCT KETONES,UA: NORMAL
SL AMB POCT NITRITE,UA: NORMAL
SL AMB POCT PH,UA: 6
SL AMB POCT SPECIFIC GRAVITY,UA: 1.02
SL AMB POCT URINE PROTEIN: NORMAL
SL AMB POCT UROBILINOGEN: 0.2

## 2023-02-15 NOTE — PROGRESS NOTES
2/15/2023      Chief Complaint   Patient presents with   • GROIN DISCOMFORT         Assessment and Plan    66 y o  male managed by Dr Dominic Murray     1  Left hydrocele   · Reviewed ultrasound from October 2022 in detail today  · Reviewed scrotal support  · Follow-up as needed    History of Present Illness  Ceasr Campbell is a 66 y o  male here for evaluation of persistent left hemiscrotal discomfort  Patient was evaluated by Dr Dominic Murray in September 2022 for left-sided scrotal pain  Patient had normal physical exam   Follow-up ultrasound only revealed a small left hydrocele  Patient presents today and states he continues to have persistent 1-2/10 left-sided aching but states he would not describe it as pain  He denies any pain when he runs or has sexual intercourse  He does wear boxers and does not routinely perform scrotal support  He denies any urinary symptoms  He denies any fevers or chills  He denies any increase in his symptoms from his previous visit  He is agreeable to plan above  Review of Systems   Constitutional: Negative for chills and fever  HENT: Negative  Respiratory: Negative  Cardiovascular: Negative  Gastrointestinal: Negative for abdominal pain, nausea and vomiting  Genitourinary: Positive for testicular pain  Negative for difficulty urinating, dysuria, flank pain, frequency, hematuria, scrotal swelling and urgency  Musculoskeletal: Negative  Skin: Negative  Neurological: Negative  AUA SYMPTOM SCORE    Flowsheet Row Most Recent Value   AUA SYMPTOM SCORE    How often have you had a sensation of not emptying your bladder completely after you finished urinating? 0   How often have you had to urinate again less than two hours after you finished urinating? 0   How often have you found you stopped and started again several times when you urinate? 0   How often have you found it difficult to postpone urination?  0   How often have you had a weak urinary stream? 0 How often have you had to push or strain to begin urination? 0   How many times did you most typically get up to urinate from the time you went to bed at night until the time you got up in the morning? 1   Quality of Life: If you were to spend the rest of your life with your urinary condition just the way it is now, how would you feel about that? 2   AUA SYMPTOM SCORE 1           Vitals  Vitals:    02/15/23 1024   BP: 118/80   BP Location: Left arm   Patient Position: Sitting   Cuff Size: Large   Pulse: (!) 54   SpO2: 97%   Weight: 83 9 kg (185 lb)   Height: 5' 10" (1 778 m)       Physical Exam  Vitals reviewed  Constitutional:       General: He is not in acute distress  Appearance: Normal appearance  He is not ill-appearing, toxic-appearing or diaphoretic  HENT:      Head: Normocephalic and atraumatic  Eyes:      Conjunctiva/sclera: Conjunctivae normal    Pulmonary:      Effort: Pulmonary effort is normal  No respiratory distress  Abdominal:      General: There is no distension  Palpations: Abdomen is soft  Tenderness: There is no abdominal tenderness  There is no right CVA tenderness, left CVA tenderness, guarding or rebound  Musculoskeletal:         General: Normal range of motion  Cervical back: Normal range of motion  Skin:     General: Skin is warm and dry  Neurological:      General: No focal deficit present  Mental Status: He is alert and oriented to person, place, and time  Psychiatric:         Mood and Affect: Mood normal          Behavior: Behavior normal          Thought Content:  Thought content normal          Judgment: Judgment normal        Past History  Past Medical History:   Diagnosis Date   • Hyperlipidemia    • Myocardial infarction [facility])     cardiac stent     Social History     Socioeconomic History   • Marital status: /Civil Union     Spouse name: None   • Number of children: None   • Years of education: None   • Highest education level: None Occupational History   • Occupation:      Employer: [employer]     Comment: Full-time   Tobacco Use   • Smoking status: Never   • Smokeless tobacco: Never   • Tobacco comments:     No tobacco/smoke exposure   Vaping Use   • Vaping Use: Never used   Substance and Sexual Activity   • Alcohol use:  Yes     Alcohol/week: 7 0 - 10 0 standard drinks     Types: 7 - 10 Cans of beer per week     Comment: Social alcohol use   • Drug use: No   • Sexual activity: None   Other Topics Concern   • None   Social History Narrative    Exercises regularly     Social Determinants of Health     Financial Resource Strain: Not on file   Food Insecurity: Not on file   Transportation Needs: Not on file   Physical Activity: Not on file   Stress: Not on file   Social Connections: Not on file   Intimate Partner Violence: Not on file   Housing Stability: Not on file     Social History     Tobacco Use   Smoking Status Never   Smokeless Tobacco Never   Tobacco Comments    No tobacco/smoke exposure     Family History   Problem Relation Age of Onset   • Coronary artery disease Father    • Hyperlipidemia Father    • Cancer Mother    • Hypertension Mother    • Hypertension Brother    • Colon cancer Neg Hx        The following portions of the patient's history were reviewed and updated as appropriate: allergies, current medications, past medical history, past social history, past surgical history and problem list     Results  No results found for this or any previous visit (from the past 1 hour(s)) ]  Lab Results   Component Value Date    PSA 2 3 10/12/2022    PSA 2 6 07/06/2018     Lab Results   Component Value Date    CALCIUM 9 5 09/01/2022    K 4 4 09/01/2022    CO2 29 09/01/2022     09/01/2022    BUN 13 09/01/2022    CREATININE 1 06 09/01/2022     Lab Results   Component Value Date    WBC 5 61 08/24/2018    HGB 15 6 08/24/2018    HCT 45 9 08/24/2018    MCV 95 08/24/2018     08/24/2018     Kayla Paulson PA-C

## 2023-05-17 ENCOUNTER — OFFICE VISIT (OUTPATIENT)
Dept: FAMILY MEDICINE CLINIC | Facility: CLINIC | Age: 58
End: 2023-05-17

## 2023-05-17 VITALS
SYSTOLIC BLOOD PRESSURE: 110 MMHG | BODY MASS INDEX: 26.05 KG/M2 | HEIGHT: 70 IN | DIASTOLIC BLOOD PRESSURE: 76 MMHG | HEART RATE: 58 BPM | WEIGHT: 182 LBS

## 2023-05-17 DIAGNOSIS — Z02.89 ENCOUNTER FOR FEDERAL AVIATION ADMINISTRATION (FAA) EXAMINATION: Primary | ICD-10-CM

## 2023-05-17 NOTE — PROGRESS NOTES
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

## 2023-08-14 DIAGNOSIS — I25.10 CORONARY ARTERY DISEASE INVOLVING NATIVE CORONARY ARTERY OF NATIVE HEART WITHOUT ANGINA PECTORIS: Primary | ICD-10-CM

## 2023-08-16 DIAGNOSIS — I25.10 CORONARY ARTERY DISEASE INVOLVING NATIVE HEART: ICD-10-CM

## 2023-08-16 DIAGNOSIS — E78.5 DYSLIPIDEMIA: ICD-10-CM

## 2023-08-16 RX ORDER — ATORVASTATIN CALCIUM 80 MG/1
TABLET, FILM COATED ORAL
Qty: 90 TABLET | Refills: 2 | Status: SHIPPED | OUTPATIENT
Start: 2023-08-16

## 2023-08-17 ENCOUNTER — TELEPHONE (OUTPATIENT)
Dept: CARDIOLOGY CLINIC | Facility: CLINIC | Age: 58
End: 2023-08-17

## 2023-08-17 NOTE — TELEPHONE ENCOUNTER
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   John MCCLENDON

## 2023-08-17 NOTE — TELEPHONE ENCOUNTER
Called pt left detailed message -pt given central scheduling# and our office number to assist with scheduling his test

## 2023-09-25 ENCOUNTER — HOSPITAL ENCOUNTER (OUTPATIENT)
Dept: NON INVASIVE DIAGNOSTICS | Facility: HOSPITAL | Age: 58
Discharge: HOME | End: 2023-09-25
Attending: INTERNAL MEDICINE
Payer: COMMERCIAL

## 2023-09-25 VITALS
HEART RATE: 59 BPM | OXYGEN SATURATION: 97 % | SYSTOLIC BLOOD PRESSURE: 118 MMHG | BODY MASS INDEX: 26.05 KG/M2 | WEIGHT: 182 LBS | HEIGHT: 70 IN | DIASTOLIC BLOOD PRESSURE: 78 MMHG

## 2023-09-25 DIAGNOSIS — I25.10 CORONARY ARTERY DISEASE INVOLVING NATIVE CORONARY ARTERY OF NATIVE HEART WITHOUT ANGINA PECTORIS: ICD-10-CM

## 2023-09-25 LAB
CHEST PAIN STATEMENT: NORMAL
MAX DIASTOLIC BP: 64 MMHG
MAX HEART RATE: 166 BPM
MAX HR PERCENT: 101 %
MAX HR: 166 BPM
MAX PREDICTED HEART RATE: 164 BPM
MAX. SYSTOLIC BP: 210 MMHG
PROTOCOL NAME: NORMAL
RATE PRESSURE PRODUCT: NORMAL
REASON FOR TERMINATION: NORMAL
SL CV STRESS RECOVERY BP: NORMAL MMHG
SL CV STRESS RECOVERY HR: 82 BPM
SL CV STRESS RECOVERY O2 SAT: 98 %
SL CV STRESS STAGE REACHED: 7
STRESS ANGINA INDEX: 0
STRESS BASELINE BP: NORMAL MMHG
STRESS BASELINE HR: 59 BPM
STRESS O2 SAT REST: 97 %
STRESS PEAK HR: 164 BPM
STRESS POST ESTIMATED WORKLOAD: 22.3 METS
STRESS POST EXERCISE DUR MIN: 19 MIN
STRESS POST EXERCISE DUR SEC: 10 SEC
STRESS POST O2 SAT PEAK: 96 %
STRESS POST PEAK BP: 210 MMHG
TARGET HR FORMULA: NORMAL
TEST INDICATION: NORMAL
TIME IN EXERCISE PHASE: NORMAL

## 2023-09-25 PROCEDURE — 93016 CV STRESS TEST SUPVJ ONLY: CPT | Performed by: INTERNAL MEDICINE

## 2023-09-25 PROCEDURE — 93018 CV STRESS TEST I&R ONLY: CPT | Performed by: INTERNAL MEDICINE

## 2023-09-25 PROCEDURE — 93017 CV STRESS TEST TRACING ONLY: CPT

## 2023-10-04 ENCOUNTER — APPOINTMENT (OUTPATIENT)
Dept: LAB | Facility: CLINIC | Age: 58
End: 2023-10-04
Payer: COMMERCIAL

## 2023-10-04 DIAGNOSIS — I25.10 CORONARY ARTERY DISEASE INVOLVING NATIVE CORONARY ARTERY OF NATIVE HEART WITHOUT ANGINA PECTORIS: ICD-10-CM

## 2023-10-04 LAB
ALBUMIN SERPL BCP-MCNC: 4.3 G/DL (ref 3.5–5)
ALP SERPL-CCNC: 64 U/L (ref 34–104)
ALT SERPL W P-5'-P-CCNC: 30 U/L (ref 7–52)
ANION GAP SERPL CALCULATED.3IONS-SCNC: 9 MMOL/L
AST SERPL W P-5'-P-CCNC: 30 U/L (ref 13–39)
BILIRUB SERPL-MCNC: 1.12 MG/DL (ref 0.2–1)
BUN SERPL-MCNC: 16 MG/DL (ref 5–25)
CALCIUM SERPL-MCNC: 9.5 MG/DL (ref 8.4–10.2)
CHLORIDE SERPL-SCNC: 103 MMOL/L (ref 96–108)
CHOLEST SERPL-MCNC: 143 MG/DL
CO2 SERPL-SCNC: 30 MMOL/L (ref 21–32)
CREAT SERPL-MCNC: 0.98 MG/DL (ref 0.6–1.3)
GFR SERPL CREATININE-BSD FRML MDRD: 85 ML/MIN/1.73SQ M
GLUCOSE P FAST SERPL-MCNC: 96 MG/DL (ref 65–99)
HDLC SERPL-MCNC: 47 MG/DL
LDLC SERPL CALC-MCNC: 78 MG/DL (ref 0–100)
POTASSIUM SERPL-SCNC: 4.3 MMOL/L (ref 3.5–5.3)
PROT SERPL-MCNC: 6.9 G/DL (ref 6.4–8.4)
SODIUM SERPL-SCNC: 142 MMOL/L (ref 135–147)
TRIGL SERPL-MCNC: 89 MG/DL

## 2023-10-04 PROCEDURE — 80061 LIPID PANEL: CPT

## 2023-10-17 ENCOUNTER — OFFICE VISIT (OUTPATIENT)
Dept: CARDIOLOGY CLINIC | Facility: CLINIC | Age: 58
End: 2023-10-17
Payer: COMMERCIAL

## 2023-10-17 VITALS
HEIGHT: 70 IN | HEART RATE: 56 BPM | WEIGHT: 179.8 LBS | SYSTOLIC BLOOD PRESSURE: 108 MMHG | DIASTOLIC BLOOD PRESSURE: 64 MMHG | BODY MASS INDEX: 25.74 KG/M2

## 2023-10-17 DIAGNOSIS — I10 BENIGN ESSENTIAL HTN: ICD-10-CM

## 2023-10-17 DIAGNOSIS — I25.10 CORONARY ARTERY DISEASE INVOLVING NATIVE CORONARY ARTERY OF NATIVE HEART WITHOUT ANGINA PECTORIS: Primary | ICD-10-CM

## 2023-10-17 DIAGNOSIS — E78.5 DYSLIPIDEMIA: ICD-10-CM

## 2023-10-17 PROCEDURE — 99214 OFFICE O/P EST MOD 30 MIN: CPT | Performed by: INTERNAL MEDICINE

## 2023-10-17 NOTE — PROGRESS NOTES
Cardiology             Kia Hidalgo  1965  [id number]              Assessment/Plan:     1. Anterior wall ST-elevation myocardial infarction 5/27/2495, complicated by VF arrest, status post defibrillation x3, therapeutic hypothermia, s/p PCI/KASSANDRA to proximal LAD  2. CAD with residual 45% nonobstructive left circumflex disease (not hemodynamically significant by iFR and FFR testing as well as by nuclear stress testing). 3.  Dyslipidemia, high suspicion of heterozygous familial hypercholesterolemia (LDL in 2015 up to 182 mg/dL in the setting of regular exercise and extremely healthy lifestyle, father with myocardial infarction at age 46)          Patient without symptoms of angina. Normal stress test 9/25/2023 with excellent functional capacity at 22.3 METS, and no evidence of myocardial ischemia. He remains active without exertional symptoms. Prior lipid panel reviewed, LDL cholesterol close to goal at 78 mg/dL. Triglycerides well controlled as well. Continue heart healthy diet, exercise, high-dose atorvastatin. We will consider changing to rosuvastatin 40 mg daily next visit if LDL cholesterol remains greater than 70 mg/dL. Prior echocardiogram 9/3/2021 with normal left ventricular systolic function, and ejection fraction estimated at 65%. There are no regional wall motion abnormalities or significant valvular disease. Blood pressure very well controlled, off antihypertensives, with today's reading 108/64 and heart rate 56 bpm.  Weight is stable at 179 pounds, with normal BMI at 25.8. 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 Patient remains at low cardiovascular risk. Follow-up in 1 year           Interval History: This is a very pleasant 82-year-old male with a history of dyslipidemia and family history of premature CAD with father having myocardial infarction at age 46. He is a very health conscious .   To review his case, on 05/20/2018 several hours into a triathlon, while running, he experienced a cardiac arrest which was witnessed. Fortunately, an AED was applied and he received 3 shocks and subsequent CPR. He presented to [facility] at which time ECG revealed anterior ST elevations. Emergent cardiac catheterization revealed 100% occlusion of proximal LAD and he thereafter underwent PCI/KASSANDRA with 3.5 x 23 mm stent to the proximal LAD. He had 10% mid RCA disease and reported 80% mid circumflex disease. He underwent therapeutic hypothermia with subsequent mild cognitive decline and transient memory loss, which has subsequently resolved. Echocardiogram 05/23/2018 revealed slight improvement in left ventricular ejection fraction at 50%. Metoprolol and lisinopril were stopped afterward due to lightheadedness. He was maintained on aspirin, Brilinta, high-dose atorvastatin, lisinopril 10 mg daily, metoprolol tartrate 25 mg twice daily. He underwent repeat coronary angiography to address the left circumflex lesion on 06/07/2018. However this was only noted to be 20% stenotic with a widely patent LAD stent, and only luminal irregularities of the mid LAD. RCA was not re-imaged. To follow [facility] [employer] [employer] regulations for going back to work, he had repeat coronary angiography on 08/24/2018 revealing 0% stenosis at the site of the prior proximal LAD stent, and 45% mid circumflex stenosis after the 1st obtuse marginal branch (39-45% by QCA analysis). iFR and FFR were both not hemodynamically significant, measuring 1.0 and 0.85, respectively. His distal RCA had 20% stenosis. Ejection fraction by left ventriculography was 65%. He performed excellent on his exercise nuclear stress test on 08/22/2018 and subsequently on his treadmill exercise stress test 08/06/2019. 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 This revealed 1 mm ST-segment depressions at peak exercise and very early recovery which quickly resolved.   He had a very good functional capacity of 18 meds, and no evidence of dysrhythmia or chest pain. Subsequent nuclear stress test 08/25/2020 was within normal limits. 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 was within normal limits. Most recent lipid panel 10/4/2022 demonstrated LDL cholesterol 78 mg/dL with triglycerides 89 mg/dL, HDL 47 mg/dL. He presents today for follow-up with no complaints. He continues to exercise vigorously and feels very well without exertional symptoms. Vitals:  Vitals:    10/17/23 0836   BP: 108/64   BP Location: Left arm   Patient Position: Sitting   Cuff Size: Large   Pulse: 56   Weight: 81.6 kg (179 lb 12.8 oz)   Height: 5' 10" (1.778 m)         Past Medical History:   Diagnosis Date   • Hyperlipidemia    • Myocardial infarction ([address])     cardiac stent     Social History     Socioeconomic History   • Marital status: /Civil Union     Spouse name: Not on file   • Number of children: Not on file   • Years of education: Not on file   • Highest education level: Not on file   Occupational History   • Occupation:      Employer: [employer]     Comment: Full-time   Tobacco Use   • Smoking status: Never   • Smokeless tobacco: Never   • Tobacco comments:     No tobacco/smoke exposure   Vaping Use   • Vaping Use: Never used   Substance and Sexual Activity   • Alcohol use:  Yes     Alcohol/week: 7.0 - 10.0 standard drinks of alcohol     Types: 7 - 10 Cans of beer per week     Comment: Social alcohol use   • Drug use: No   • Sexual activity: Not on file   Other Topics Concern   • Not on file   Social History Narrative    Exercises regularly     Social Determinants of Health     Financial Resource Strain: Not on file   Food Insecurity: Not on file   Transportation Needs: Not on file   Physical Activity: Not on file   Stress: Not on file   Social Connections: Not on file   Intimate Partner Violence: Not on file   Housing Stability: Not on file Family History   Problem Relation Age of Onset   • Coronary artery disease Father    • Hyperlipidemia Father    • Cancer Mother    • Hypertension Mother    • Hypertension Brother    • Colon cancer Neg Hx      Past Surgical History:   Procedure Laterality Date   • HIP SURGERY  2011    Trochanteric fracture requiring nail, stress fx    • WISDOM TOOTH EXTRACTION         Current Outpatient Medications:   •  aspirin 81 mg chewable tablet, Chew 81 mg daily  , Disp: , Rfl:   •  atorvastatin (LIPITOR) 80 mg tablet, TAKE 1 TABLET BY MOUTH EVERY DAY, Disp: 90 tablet, Rfl: 2  •  Cholecalciferol (VITAMIN D PO), Take 50 Units by mouth 2 (two) times a day  , Disp: , Rfl:   •  Coenzyme Q10 200 MG capsule, Take 200 mg by mouth daily, Disp: , Rfl:   •  Multiple Vitamins-Minerals (MULTIVIT/MULTIMINERAL ADULT PO), Take by mouth, Disp: , Rfl:         Review of Systems:  Review of Systems   Respiratory: Negative. Cardiovascular: Negative. All other systems reviewed and are negative. Physical Exam:  Physical Exam  Constitutional:       General: He is not in acute distress. Appearance: He is well-developed. He is not diaphoretic. HENT:      Head: Normocephalic and atraumatic. Eyes:      General: No scleral icterus. Right eye: No discharge. Pupils: Pupils are equal, round, and reactive to light. Neck:      Thyroid: No thyromegaly. Cardiovascular:      Rate and Rhythm: Normal rate and regular rhythm. Heart sounds: Normal heart sounds. No murmur heard. No friction rub. No gallop. Pulmonary:      Effort: Pulmonary effort is normal.      Breath sounds: Normal breath sounds. Abdominal:      General: There is no distension. Tenderness: There is no abdominal tenderness. There is no guarding or rebound. Musculoskeletal:         General: Normal range of motion. Cervical back: Normal range of motion and neck supple. Skin:     General: Skin is warm and dry.       Coloration: Skin is not pale. Findings: No erythema or rash. Neurological:      Mental Status: He is alert and oriented to person, place, and time. Coordination: Coordination normal.   Psychiatric:         Behavior: Behavior normal.         Thought Content: Thought content normal.         Judgment: Judgment normal.         This note was completed in part utilizing M-Modal Fluency Direct Software. Grammatical errors, random word insertions, spelling mistakes, and incomplete sentences can be an occasional consequence of this system secondary to software limitations, ambient noise, and hardware issues. If you have any questions or concerns about the content, text, or information contained within the body of this dictation, please contact the provider for clarification.

## 2023-11-09 ENCOUNTER — OFFICE VISIT (OUTPATIENT)
Dept: FAMILY MEDICINE CLINIC | Facility: CLINIC | Age: 58
End: 2023-11-09

## 2023-11-09 VITALS
BODY MASS INDEX: 26.2 KG/M2 | WEIGHT: 183 LBS | DIASTOLIC BLOOD PRESSURE: 80 MMHG | SYSTOLIC BLOOD PRESSURE: 118 MMHG | HEART RATE: 60 BPM | HEIGHT: 70 IN

## 2023-11-09 DIAGNOSIS — Z02.89 ENCOUNTER FOR FEDERAL AVIATION ADMINISTRATION (FAA) EXAMINATION: Primary | ICD-10-CM

## 2023-11-09 PROCEDURE — 99499 UNLISTED E&M SERVICE: CPT | Performed by: FAMILY MEDICINE

## 2023-11-09 NOTE — PROGRESS NOTES
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

## 2024-04-08 ENCOUNTER — OFFICE VISIT (OUTPATIENT)
Dept: FAMILY MEDICINE CLINIC | Facility: CLINIC | Age: 59
End: 2024-04-08
Payer: COMMERCIAL

## 2024-04-08 VITALS
HEART RATE: 72 BPM | TEMPERATURE: 97.2 F | WEIGHT: 184.6 LBS | OXYGEN SATURATION: 97 % | DIASTOLIC BLOOD PRESSURE: 80 MMHG | HEIGHT: 70 IN | RESPIRATION RATE: 18 BRPM | SYSTOLIC BLOOD PRESSURE: 122 MMHG | BODY MASS INDEX: 26.43 KG/M2

## 2024-04-08 DIAGNOSIS — Z00.6 ENCOUNTER FOR EXAMINATION FOR NORMAL COMPARISON OR CONTROL IN CLINICAL RESEARCH PROGRAM: ICD-10-CM

## 2024-04-08 DIAGNOSIS — E78.00 HYPERCHOLESTEROLEMIA: ICD-10-CM

## 2024-04-08 DIAGNOSIS — Z12.5 PROSTATE CANCER SCREENING: ICD-10-CM

## 2024-04-08 DIAGNOSIS — Z11.59 NEED FOR HEPATITIS C SCREENING TEST: ICD-10-CM

## 2024-04-08 DIAGNOSIS — E55.9 VITAMIN D DEFICIENCY: ICD-10-CM

## 2024-04-08 DIAGNOSIS — Z11.4 SCREENING FOR HIV (HUMAN IMMUNODEFICIENCY VIRUS): ICD-10-CM

## 2024-04-08 DIAGNOSIS — I25.10 ATHEROSCLEROSIS OF NATIVE CORONARY ARTERY OF NATIVE HEART WITHOUT ANGINA PECTORIS: ICD-10-CM

## 2024-04-08 DIAGNOSIS — Z00.00 ANNUAL PHYSICAL EXAM: Primary | ICD-10-CM

## 2024-04-08 PROCEDURE — 99396 PREV VISIT EST AGE 40-64: CPT | Performed by: PHYSICIAN ASSISTANT

## 2024-04-08 NOTE — PROGRESS NOTES
ADULT ANNUAL PHYSICAL  [facility] PRIMARY CARE    NAME: Jaspreet Hayes  AGE: 59 y.o. SEX: male  : 1965     DATE: 4/10/2024     Assessment and Plan:     Problem List Items Addressed This Visit          Cardiovascular and Mediastinum    Atherosclerotic heart disease of native coronary artery without angina pectoris     Asymptomatic.  Status post STEMI in 2018 with stent placement.  Continue aspirin and atorvastatin as directed by cardiology.            Other    Vitamin D deficiency     Currently supplementing.  Recheck level with next labs.         Relevant Orders    Vitamin D 25 hydroxy    Hypercholesterolemia     Currently on atorvastatin 80 mg daily with last LDL in 1970.  Continue with cardiology as directed.          Other Visit Diagnoses       Annual physical exam    -  Primary    Prostate cancer screening        Relevant Orders    PSA, Total Screen    Need for hepatitis C screening test        Relevant Orders    Hepatitis C antibody    Screening for HIV (human immunodeficiency virus)        Relevant Orders    HIV 1/2 AG/AB w Reflex [facility] for 2 yr old and above        The USPSTF recommendation for HIV screening in all patients between 15 and 65 years old (once in lifetime or annually with risk factors) was discussed with the patient.  The patient agreed to testing.        Immunizations and preventive care screenings were discussed with patient today. Appropriate education was printed on patient's after visit summary.    Discussed risks and benefits of prostate cancer screening. We discussed the controversial history of PSA screening for prostate cancer in the [place] as well as the risk of over detection and over treatment of prostate cancer by way of PSA screening.  The patient understands that PSA blood testing is an imperfect way to screen for prostate cancer and that elevated PSA levels in the blood may also be caused by infection,  inflammation, prostatic trauma or manipulation, urological procedures, or by benign prostatic enlargement.    The role of the digital rectal examination in prostate cancer screening was also discussed and I discussed with him that there is large interobserver variability in the findings of digital rectal examination.  *PSA ordered and prostate exam done in office today*    Counseling:  Exercise: the importance of regular exercise/physical activity was discussed. Recommend exercise 3-5 times per week for at least 30 minutes.          Return in about 1 year (around 4/8/2025) for Annual physical.     Chief Complaint:     Chief Complaint   Patient presents with    Establish Care     Patient requesting routine lab work. Would like to discuss necessary vaccinations, but declines receiving any vaccines today d/t travel over the next few nights.      History of Present Illness:     Adult Annual Physical   Patient here for a comprehensive physical exam. The patient reports problems - as below .    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   Exercise:  averages 1 hour at least 4-5 times weekly - swimming, biking, running, etc .      Depression Screening  PHQ-2/9 Depression Screening    Little interest or pleasure in doing things: 0 - not at all  Feeling down, depressed, or hopeless: 0 - not at all  PHQ-2 Score: 0  PHQ-2 Interpretation: Negative depression screen       General Health  Sleep: sleeps well and notes that he snores and is getting 6 hours a night .   Hearing: decreased - bilateral. Has tinnitus and hearing loss in the lower levels   Vision: goes for regular eye exams and wears glasses.   Dental: regular dental visits.        Health  Symptoms include: nocturia    Advanced Care Planning  Do you have  an advanced directive? yes  Do you have a durable medical power of ? yes  ACP document given to patient? no     Review of Systems:     Review of Systems   Constitutional:  Negative for chills and fever.   HENT:  Positive for postnasal drip. Negative for congestion, rhinorrhea and sore throat.    Respiratory:  Negative for cough, shortness of breath and wheezing.    Cardiovascular:  Negative for chest pain, palpitations and leg swelling.   Gastrointestinal:  Negative for abdominal pain, blood in stool, constipation, diarrhea, nausea and vomiting.   Genitourinary:  Negative for dysuria and frequency.   Musculoskeletal:  Negative for arthralgias and myalgias.   Skin:  Negative for rash.   Neurological:  Negative for dizziness, syncope and headaches.   Hematological:  Does not bruise/bleed easily.   Psychiatric/Behavioral:  Negative for dysphoric mood. The patient is not nervous/anxious.       Past Medical History:     Past Medical History:   Diagnosis Date    Coronary artery disease 05/18/2018    Hyperlipidemia     Myocardial infarction (HCC)     cardiac stent      Past Surgical History:     Past Surgical History:   Procedure Laterality Date    CORONARY STENT PLACEMENT  05/20/2018    LAD    HIP SURGERY  2011    Trochanteric fracture requiring nail, stress fx     VASECTOMY  2000    WISDOM TOOTH EXTRACTION        Family History:     Family History   Problem Relation Age of Onset    Cancer Mother         lung cancer    Hypertension Mother     Breast cancer Mother     Coronary artery disease Father     Hyperlipidemia Father     Hypertension Father     Hypertension Sister     Hypertension Brother     Hypertension Brother     No Known Problems Son     No Known Problems Son     No Known Problems Daughter     Colon cancer Neg Hx       Social History:     Social History     Socioeconomic History    Marital status: /Civil Union     Spouse name: None    Number of children: None    Years of education: None     "Highest education level: None   Occupational History    Occupation:      Employer: [employer]     Comment: Full-time   Tobacco Use    Smoking status: Never     Passive exposure: Past    Smokeless tobacco: Never    Tobacco comments:     No tobacco/smoke exposure   Vaping Use    Vaping status: Never Used   Substance and Sexual Activity    Alcohol use: Not Currently     Comment: Social    Drug use: Never    Sexual activity: Yes     Partners: Female     Birth control/protection: Male Sterilization   Other Topics Concern    None   Social History Narrative    Exercises regularly     Social Determinants of Health     Financial Resource Strain: Not on file   Food Insecurity: Not on file   Transportation Needs: Not on file   Physical Activity: Not on file   Stress: Not on file   Social Connections: Not on file   Intimate Partner Violence: Not on file   Housing Stability: Not on file      Current Medications:     Current Outpatient Medications   Medication Sig Dispense Refill    atorvastatin (LIPITOR) 80 mg tablet TAKE 1 TABLET BY MOUTH EVERY DAY 90 tablet 2    Cholecalciferol (VITAMIN D PO) Take 40 mcg by mouth in the morning      Coenzyme Q10 200 MG capsule Take 200 mg by mouth daily      aspirin 81 mg chewable tablet Chew 81 mg daily         No current facility-administered medications for this visit.      Allergies:     No Known Allergies   Physical Exam:     /80 (BP Location: Left arm, Patient Position: Sitting, Cuff Size: Large)   Pulse 72   Temp (!) 97.2 °F (36.2 °C) (Tympanic)   Resp 18   Ht 5' 10\" (1.778 m)   Wt 83.7 kg (184 lb 9.6 oz)   SpO2 97%   BMI 26.49 kg/m²     Physical Exam  Vitals reviewed. Exam conducted with a chaperone present (DENEEN Serrano).   Constitutional:       General: He is not in acute distress.     Appearance: Normal appearance. He is well-developed. He is not ill-appearing.   HENT:      Head: Normocephalic and atraumatic.      Right Ear: Tympanic membrane, ear canal and " external ear normal.      Left Ear: Tympanic membrane, ear canal and external ear normal.      Mouth/Throat:      Mouth: Mucous membranes are moist.      Pharynx: No oropharyngeal exudate or posterior oropharyngeal erythema.   Eyes:      Conjunctiva/sclera: Conjunctivae normal.      Pupils: Pupils are equal, round, and reactive to light.   Neck:      Thyroid: No thyromegaly.      Vascular: No carotid bruit.   Cardiovascular:      Rate and Rhythm: Normal rate and regular rhythm.      Pulses: Normal pulses.      Heart sounds: Normal heart sounds. No murmur heard.  Pulmonary:      Effort: Pulmonary effort is normal.      Breath sounds: Normal breath sounds. No wheezing, rhonchi or rales.   Abdominal:      General: Bowel sounds are normal. There is no distension.      Palpations: Abdomen is soft. There is no mass.      Tenderness: There is no abdominal tenderness.   Genitourinary:     Prostate: Enlarged (mildly to moderately). Not tender and no nodules present.   Musculoskeletal:      Cervical back: Normal range of motion and neck supple.      Right lower leg: No edema.      Left lower leg: No edema.   Lymphadenopathy:      Cervical: No cervical adenopathy.   Skin:     General: Skin is warm and dry.      Findings: No rash.   Neurological:      Mental Status: He is alert.      Sensory: No sensory deficit.      Motor: No weakness.   Psychiatric:         Mood and Affect: Mood normal.         Behavior: Behavior normal.         Thought Content: Thought content normal.         Judgment: Judgment normal.          Ana Ramirez PA-C  [facility] PR[facility]

## 2024-04-10 PROBLEM — I25.2 HISTORY OF ST ELEVATION MYOCARDIAL INFARCTION (STEMI): Status: ACTIVE | Noted: 2018-05-20

## 2024-04-10 NOTE — ASSESSMENT & PLAN NOTE
Asymptomatic.  Status post STEMI in 2018 with stent placement.  Continue aspirin and atorvastatin as directed by cardiology.

## 2024-04-10 NOTE — ASSESSMENT & PLAN NOTE
Currently on atorvastatin 80 mg daily with last LDL in October 1970.  Continue with cardiology as directed.

## 2024-04-12 ENCOUNTER — CLINICAL SUPPORT (OUTPATIENT)
Dept: FAMILY MEDICINE CLINIC | Facility: CLINIC | Age: 59
End: 2024-04-12

## 2024-04-12 DIAGNOSIS — Z23 ENCOUNTER FOR IMMUNIZATION: Primary | ICD-10-CM

## 2024-04-13 DIAGNOSIS — I25.10 CORONARY ARTERY DISEASE INVOLVING NATIVE HEART: ICD-10-CM

## 2024-04-13 DIAGNOSIS — E78.5 DYSLIPIDEMIA: ICD-10-CM

## 2024-04-15 RX ORDER — ATORVASTATIN CALCIUM 80 MG/1
TABLET, FILM COATED ORAL
Qty: 90 TABLET | Refills: 1 | Status: SHIPPED | OUTPATIENT
Start: 2024-04-15

## 2024-04-15 NOTE — TELEPHONE ENCOUNTER
Reason for call: Patient Called stating that the pharmacy does not have a refill for this medication  [x] Refill   [] Prior Auth  [] Other:     Office:   [] PCP/Provider -   [] Specialty/Provider -     Medication: atorvastatin (LIPITOR) 80 mg tablet      Dose/Frequency:  TAKE 1 TABLET BY MOUTH EVERY DAY     Quantity: 90    Pharmacy: [facility]/pharmacy #[id number] - [place], [address]     Does the patient have enough for 3 days?   [x] Yes   [] No - Send as HP to POD

## 2024-05-03 ENCOUNTER — APPOINTMENT (OUTPATIENT)
Dept: LAB | Facility: CLINIC | Age: 59
End: 2024-05-03
Payer: COMMERCIAL

## 2024-05-03 DIAGNOSIS — Z11.4 SCREENING FOR HIV (HUMAN IMMUNODEFICIENCY VIRUS): ICD-10-CM

## 2024-05-03 DIAGNOSIS — Z12.5 PROSTATE CANCER SCREENING: ICD-10-CM

## 2024-05-03 DIAGNOSIS — E55.9 VITAMIN D DEFICIENCY: ICD-10-CM

## 2024-05-03 DIAGNOSIS — Z11.59 NEED FOR HEPATITIS C SCREENING TEST: ICD-10-CM

## 2024-05-03 DIAGNOSIS — Z00.6 ENCOUNTER FOR EXAMINATION FOR NORMAL COMPARISON OR CONTROL IN CLINICAL RESEARCH PROGRAM: ICD-10-CM

## 2024-05-03 LAB
25(OH)D3 SERPL-MCNC: 36.2 NG/ML (ref 30–100)
HCV AB SER QL: NORMAL
HIV 1+2 AB+HIV1 P24 AG SERPL QL IA: NORMAL
HIV 2 AB SERPL QL IA: NORMAL
HIV1 AB SERPL QL IA: NORMAL
HIV1 P24 AG SERPL QL IA: NORMAL
PSA SERPL-MCNC: 3.1 NG/ML (ref 0–4)

## 2024-05-03 PROCEDURE — 36415 COLL VENOUS BLD VENIPUNCTURE: CPT

## 2024-05-03 PROCEDURE — G0103 PSA SCREENING: HCPCS

## 2024-05-03 PROCEDURE — 82306 VITAMIN D 25 HYDROXY: CPT

## 2024-05-03 PROCEDURE — 86803 HEPATITIS C AB TEST: CPT

## 2024-05-03 PROCEDURE — 87389 HIV-1 AG W/HIV-1&-2 AB AG IA: CPT

## 2024-05-05 DIAGNOSIS — R97.20 PSA ELEVATION: Primary | ICD-10-CM

## 2024-05-08 ENCOUNTER — OFFICE VISIT (OUTPATIENT)
Dept: FAMILY MEDICINE CLINIC | Facility: CLINIC | Age: 59
End: 2024-05-08

## 2024-05-08 VITALS
SYSTOLIC BLOOD PRESSURE: 120 MMHG | WEIGHT: 182 LBS | DIASTOLIC BLOOD PRESSURE: 80 MMHG | BODY MASS INDEX: 26.05 KG/M2 | HEART RATE: 59 BPM | HEIGHT: 70 IN

## 2024-05-08 DIAGNOSIS — Z02.89 ENCOUNTER FOR FEDERAL AVIATION ADMINISTRATION (FAA) EXAMINATION: Primary | ICD-10-CM

## 2024-05-08 PROCEDURE — 99499 UNLISTED E&M SERVICE: CPT | Performed by: FAMILY MEDICINE

## 2024-05-08 PROCEDURE — 93000 ELECTROCARDIOGRAM COMPLETE: CPT | Performed by: FAMILY MEDICINE

## 2024-05-08 NOTE — PROGRESS NOTES
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

## 2024-05-23 ENCOUNTER — TELEPHONE (OUTPATIENT)
Dept: OBGYN CLINIC | Facility: MEDICAL CENTER | Age: 59
End: 2024-05-23

## 2024-05-23 NOTE — TELEPHONE ENCOUNTER
Called pt l/m see below.    Hi, Niall,  Your labs show normal vitamin D level and negative (normal) screening test for hep C and HIV.  Your PSA has increased since last year.  The increase is more than would be typically expected in 1 year; however, you had a higher PSA in between these 2 values about 5 years ago.  I recommend repeating a PSA in the next few weeks or so.  Please be sure to avoid intercourse or strenuous exercise within 48 hours of your repeat PSA testing.  If similarly elevated, I would recommend further follow-up with urology.  Sincerely,  Ana Ramirez PA-C  (message sent to patient via [employer])

## 2024-06-13 ENCOUNTER — APPOINTMENT (OUTPATIENT)
Dept: LAB | Facility: CLINIC | Age: 59
End: 2024-06-13
Payer: COMMERCIAL

## 2024-06-13 DIAGNOSIS — R97.20 PSA ELEVATION: ICD-10-CM

## 2024-06-13 LAB
PSA FREE MFR SERPL: 18.84 %
PSA FREE SERPL-MCNC: 0.58 NG/ML
PSA SERPL-MCNC: 3.08 NG/ML (ref 0–4)

## 2024-06-13 PROCEDURE — 84153 ASSAY OF PSA TOTAL: CPT

## 2024-06-13 PROCEDURE — 84154 ASSAY OF PSA FREE: CPT

## 2024-06-13 PROCEDURE — 36415 COLL VENOUS BLD VENIPUNCTURE: CPT

## 2024-06-17 DIAGNOSIS — R97.20 INCREASED PROSTATE SPECIFIC ANTIGEN (PSA) VELOCITY: Primary | ICD-10-CM

## 2024-07-10 LAB
APOB+LDLR+PCSK9 GENE MUT ANL BLD/T: NOT DETECTED
BRCA1+BRCA2 DEL+DUP + FULL MUT ANL BLD/T: NOT DETECTED
MLH1+MSH2+MSH6+PMS2 GN DEL+DUP+FUL M: NOT DETECTED

## 2024-07-19 ENCOUNTER — OFFICE VISIT (OUTPATIENT)
Dept: UROLOGY | Facility: MEDICAL CENTER | Age: 59
End: 2024-07-19
Payer: COMMERCIAL

## 2024-07-19 VITALS
WEIGHT: 180 LBS | OXYGEN SATURATION: 97 % | HEART RATE: 80 BPM | BODY MASS INDEX: 25.77 KG/M2 | SYSTOLIC BLOOD PRESSURE: 132 MMHG | HEIGHT: 70 IN | DIASTOLIC BLOOD PRESSURE: 82 MMHG

## 2024-07-19 DIAGNOSIS — R97.20 INCREASED PROSTATE SPECIFIC ANTIGEN (PSA) VELOCITY: ICD-10-CM

## 2024-07-19 DIAGNOSIS — Z12.5 SCREENING FOR PROSTATE CANCER: Primary | ICD-10-CM

## 2024-07-19 PROCEDURE — 99213 OFFICE O/P EST LOW 20 MIN: CPT

## 2024-07-19 NOTE — ASSESSMENT & PLAN NOTE
Patient was referred by his PCP for an increase from his PSA baseline which was previously was 2.3-2.6.   Most recent PSA preformed 6/13/24 and was found to be 3.079. Refer to PSA trend below.   We discussed that it is reassuring that his PSA has remained stable at nearly the same value one month apart and may be his new baseline.   JAYCOB preformed today. Refer to physical exam findings.   Repeat PSA in 1 year with f/u in the office and a JAYCOB at that time.    Lab Results   Component Value Date    PSA 3.079 06/13/2024    PSA 3.10 05/03/2024    PSA 2.3 10/12/2022

## 2024-07-19 NOTE — PROGRESS NOTES
7/19/2024      Assessment and Plan    59 y.o. male managed by Dr. Brandon    Screening for prostate cancer  Patient was referred by his PCP for an increase from his PSA baseline which was previously was 2.3-2.6.   Most recent PSA preformed 6/13/24 and was found to be 3.079. Refer to PSA trend below.   We discussed that it is reassuring that his PSA has remained stable at nearly the same value one month apart and may be his new baseline.   JAYCOB preformed today. Refer to physical exam findings.   Repeat PSA in 1 year with f/u in the office and a JAYCOB at that time.    Lab Results   Component Value Date    PSA 3.079 06/13/2024    PSA 3.10 05/03/2024    PSA 2.3 10/12/2022            History of Present Illness  Jaspreet Hayes is a 59 y.o. male here for evaluation of prostate cancer screening. The patient was last seen in our office on 2/15/23 for persistent left hemiscrotal discomfort. Follow-up ultrasound only revealed a small left hydrocele. The patient was advised to wear briefs and reports that since he has made this conservative change that his testicular pain has resolved. Most recently, the patient's PCP referred him to urology due to a significant rise in his PSA. The patient's PSA was preformed 5/3/24 and increased from his previous base line of 2.3-2.6. The patient underwent repeat PSA testing on 6/13/24 and it remained stable at 3.079. AUA symptom score 3. The patient reports that his most bothersome urinary tract symptom is waking up once per night after 6 hours of sleep. The patient notes nocturia x 1. The patient denies weakened urinary stream, worsening urinary frequency/urgency, feelings of incomplete bladder emptying, hematuria, or dysuria.         Review of Systems   Constitutional:  Negative for chills and fever.   HENT:  Negative for ear pain and sore throat.    Eyes:  Negative for pain and visual disturbance.   Respiratory:  Negative for cough and shortness of breath.    Cardiovascular:  Negative for  "chest pain and palpitations.   Gastrointestinal:  Negative for abdominal pain and vomiting.   Genitourinary:  Negative for decreased urine volume, difficulty urinating, dysuria, flank pain, frequency, hematuria, testicular pain and urgency.   Musculoskeletal:  Negative for arthralgias and back pain.   Skin:  Negative for color change and rash.   Neurological:  Negative for seizures and syncope.   All other systems reviewed and are negative.          AUA SYMPTOM SCORE      Flowsheet Row Most Recent Value   AUA SYMPTOM SCORE    How often have you had a sensation of not emptying your bladder completely after you finished urinating? 0 (P)     How often have you had to urinate again less than two hours after you finished urinating? 0 (P)     How often have you found you stopped and started again several times when you urinate? 0 (P)     How often have you found it difficult to postpone urination? 0 (P)     How often have you had a weak urinary stream? 0 (P)     How often have you had to push or strain to begin urination? 0 (P)     How many times did you most typically get up to urinate from the time you went to bed at night until the time you got up in the morning? 3 (P)     Quality of Life: If you were to spend the rest of your life with your urinary condition just the way it is now, how would you feel about that? 2 (P)     AUA SYMPTOM SCORE 3 (P)               Vitals  Vitals:    07/19/24 1459   BP: 132/82   BP Location: Left arm   Patient Position: Sitting   Cuff Size: Adult   Pulse: 80   SpO2: 97%   Weight: 81.6 kg (180 lb)   Height: 5' 10\" (1.778 m)       Physical Exam  Vitals reviewed.   Constitutional:       General: He is not in acute distress.     Appearance: Normal appearance. He is not ill-appearing.   HENT:      Head: Normocephalic and atraumatic.      Nose: Nose normal.   Eyes:      General: No scleral icterus.  Pulmonary:      Effort: No respiratory distress.   Abdominal:      General: Abdomen is flat. There " is no distension.      Palpations: Abdomen is soft.      Tenderness: There is no abdominal tenderness.   Genitourinary:     Comments: JAYCOB preformed today. Prostate estimated to be about 50-55 grams and smooth bilaterally without nodularity or induration.   Musculoskeletal:         General: Normal range of motion.      Cervical back: Normal range of motion.   Skin:     General: Skin is warm.      Coloration: Skin is not jaundiced.   Neurological:      Mental Status: He is alert and oriented to person, place, and time.      Gait: Gait normal.   Psychiatric:         Mood and Affect: Mood normal.         Behavior: Behavior normal.           Past History  Past Medical History:   Diagnosis Date    Coronary artery disease 05/18/2018    Hyperlipidemia     Myocardial infarction (HCC)     cardiac stent     Social History     Socioeconomic History    Marital status: /Civil Union     Spouse name: None    Number of children: None    Years of education: None    Highest education level: None   Occupational History    Occupation: [employer]     Employer: [employer]     Comment: Full-time   Tobacco Use    Smoking status: Never     Passive exposure: Past    Smokeless tobacco: Never    Tobacco comments:     No tobacco/smoke exposure   Vaping Use    Vaping status: Never Used   Substance and Sexual Activity    Alcohol use: Yes     Comment: Social    Drug use: Never    Sexual activity: Yes     Partners: Female     Birth control/protection: Male Sterilization   Other Topics Concern    None   Social History Narrative    Exercises regularly     Social Determinants of Health     Financial Resource Strain: Not on file   Food Insecurity: Not on file   Transportation Needs: Not on file   Physical Activity: Not on file   Stress: Not on file   Social Connections: Not on file   Intimate Partner Violence: Not on file   Housing Stability: Not on file     Social History     Tobacco Use   Smoking Status Never    Passive exposure: Past    Smokeless Tobacco Never   Tobacco Comments    No tobacco/smoke exposure     Family History   Problem Relation Age of Onset    Cancer Mother         lung cancer    Hypertension Mother     Breast cancer Mother     Coronary artery disease Father     Hyperlipidemia Father     Hypertension Father     Hypertension Sister     Hypertension Brother     Hypertension Brother     No Known Problems Son     No Known Problems Son     No Known Problems Daughter     Colon cancer Neg Hx        The following portions of the patient's history were reviewed and updated as appropriate: allergies, current medications, past medical history, past social history, past surgical history and problem list.    Results  No results found for this or any previous visit (from the past 1 hour(s)).]  Lab Results   Component Value Date    PSA 3.079 06/13/2024    PSA 3.10 05/03/2024    PSA 2.3 10/12/2022    PSA 2.6 07/06/2018     Lab Results   Component Value Date    CALCIUM 9.5 10/04/2023    K 4.3 10/04/2023    CO2 30 10/04/2023     10/04/2023    BUN 16 10/04/2023    CREATININE 0.98 10/04/2023     Lab Results   Component Value Date    WBC 5.61 08/24/2018    HGB 15.6 08/24/2018    HCT 45.9 08/24/2018    MCV 95 08/24/2018     08/24/2018

## 2024-08-18 PROBLEM — Z12.5 SCREENING FOR PROSTATE CANCER: Status: RESOLVED | Noted: 2019-04-22 | Resolved: 2024-08-18

## 2024-09-09 ENCOUNTER — TELEPHONE (OUTPATIENT)
Dept: CARDIOLOGY CLINIC | Facility: CLINIC | Age: 59
End: 2024-09-09

## 2024-09-09 ENCOUNTER — PATIENT MESSAGE (OUTPATIENT)
Dept: CARDIOLOGY CLINIC | Facility: CLINIC | Age: 59
End: 2024-09-09

## 2024-09-09 DIAGNOSIS — I25.10 CORONARY ARTERY DISEASE INVOLVING NATIVE CORONARY ARTERY OF NATIVE HEART WITHOUT ANGINA PECTORIS: Primary | ICD-10-CM

## 2024-10-01 ENCOUNTER — HOSPITAL ENCOUNTER (OUTPATIENT)
Dept: NON INVASIVE DIAGNOSTICS | Facility: HOSPITAL | Age: 59
Discharge: HOME | End: 2024-10-01
Attending: INTERNAL MEDICINE
Payer: COMMERCIAL

## 2024-10-01 DIAGNOSIS — I25.10 CORONARY ARTERY DISEASE INVOLVING NATIVE CORONARY ARTERY OF NATIVE HEART WITHOUT ANGINA PECTORIS: ICD-10-CM

## 2024-10-01 LAB
CHEST PAIN STATEMENT: NORMAL
MAX DIASTOLIC BP: 60 MMHG
MAX HR PERCENT: 100 %
MAX HR: 164 BPM
MAX PREDICTED HEART RATE: 163 BPM
PROTOCOL NAME: NORMAL
RATE PRESSURE PRODUCT: NORMAL
REASON FOR TERMINATION: NORMAL
SL CV STRESS RECOVERY BP: NORMAL MMHG
SL CV STRESS RECOVERY HR: 83 BPM
SL CV STRESS RECOVERY O2 SAT: 98 %
SL CV STRESS STAGE REACHED: 6
STRESS ANGINA INDEX: 0
STRESS BASELINE BP: NORMAL MMHG
STRESS BASELINE HR: 58 BPM
STRESS O2 SAT REST: 97 %
STRESS PEAK HR: 164 BPM
STRESS POST ESTIMATED WORKLOAD: 20 METS
STRESS POST EXERCISE DUR MIN: 16 MIN
STRESS POST EXERCISE DUR MIN: 16 MIN
STRESS POST EXERCISE DUR SEC: 51 SEC
STRESS POST EXERCISE DUR SEC: 51 SEC
STRESS POST O2 SAT PEAK: 98 %
STRESS POST PEAK BP: 208 MMHG
STRESS POST PEAK HR: 164 BPM
STRESS POST PEAK SYSTOLIC BP: 200 MMHG
TARGET HR FORMULA: NORMAL
TEST INDICATION: NORMAL

## 2024-10-01 PROCEDURE — 93016 CV STRESS TEST SUPVJ ONLY: CPT | Performed by: INTERNAL MEDICINE

## 2024-10-01 PROCEDURE — 93017 CV STRESS TEST TRACING ONLY: CPT

## 2024-10-01 PROCEDURE — 93018 CV STRESS TEST I&R ONLY: CPT | Performed by: INTERNAL MEDICINE

## 2024-10-06 DIAGNOSIS — E78.5 DYSLIPIDEMIA: ICD-10-CM

## 2024-10-07 RX ORDER — ATORVASTATIN CALCIUM 80 MG/1
TABLET, FILM COATED ORAL
Qty: 30 TABLET | Refills: 0 | Status: SHIPPED | OUTPATIENT
Start: 2024-10-07

## 2024-11-02 DIAGNOSIS — E78.5 DYSLIPIDEMIA: ICD-10-CM

## 2024-11-04 RX ORDER — ATORVASTATIN CALCIUM 80 MG/1
TABLET, FILM COATED ORAL
Qty: 30 TABLET | Refills: 0 | Status: SHIPPED | OUTPATIENT
Start: 2024-11-04 | End: 2024-11-07 | Stop reason: ALTCHOICE

## 2024-11-05 ENCOUNTER — OFFICE VISIT (OUTPATIENT)
Dept: CARDIOLOGY CLINIC | Facility: CLINIC | Age: 59
End: 2024-11-05
Payer: COMMERCIAL

## 2024-11-05 ENCOUNTER — APPOINTMENT (OUTPATIENT)
Dept: LAB | Facility: HOSPITAL | Age: 59
End: 2024-11-05
Payer: COMMERCIAL

## 2024-11-05 VITALS
HEART RATE: 60 BPM | BODY MASS INDEX: 26.72 KG/M2 | WEIGHT: 186.2 LBS | SYSTOLIC BLOOD PRESSURE: 120 MMHG | DIASTOLIC BLOOD PRESSURE: 80 MMHG

## 2024-11-05 DIAGNOSIS — E78.00 HYPERCHOLESTEROLEMIA: ICD-10-CM

## 2024-11-05 DIAGNOSIS — I25.10 CORONARY ARTERY DISEASE INVOLVING NATIVE CORONARY ARTERY OF NATIVE HEART WITHOUT ANGINA PECTORIS: ICD-10-CM

## 2024-11-05 DIAGNOSIS — Z86.74 HISTORY OF SUDDEN CARDIAC ARREST SUCCESSFULLY RESUSCITATED: Primary | ICD-10-CM

## 2024-11-05 LAB
CHOLEST SERPL-MCNC: 160 MG/DL
HDLC SERPL-MCNC: 45 MG/DL
LDLC SERPL CALC-MCNC: 87 MG/DL (ref 0–100)
TRIGL SERPL-MCNC: 139 MG/DL

## 2024-11-05 PROCEDURE — 36415 COLL VENOUS BLD VENIPUNCTURE: CPT

## 2024-11-05 PROCEDURE — 99214 OFFICE O/P EST MOD 30 MIN: CPT | Performed by: PHYSICIAN ASSISTANT

## 2024-11-05 PROCEDURE — 80061 LIPID PANEL: CPT

## 2024-11-05 NOTE — PROGRESS NOTES
Cardiology                  Jaspreet Hayes  1965  [id number]                    Assessment/Plan:     1.  Anterior wall ST-elevation myocardial infarction 5/20/2018, complicated by VF arrest, status post defibrillation x3, therapeutic hypothermia, s/p PCI/KASSANDRA to proximal LAD  2.  CAD with residual 45% nonobstructive left circumflex disease (not hemodynamically significant by iFR and FFR testing as well as by nuclear stress testing).  3.  Dyslipidemia, high suspicion of heterozygous familial hypercholesterolemia (LDL in 2015 up to 182 mg/dL in the setting of regular exercise and extremely healthy lifestyle, father with myocardial infarction at age 51)           Patient without symptoms of angina.  Normal stress test 10/1/2024 with excellent functional capacity at 20 METS, and no evidence of myocardial ischemia.  He remains active without exertional symptoms.  Today's lipid panel reviewed, LDL cholesterol not at goal at 87 mg/dL.  Triglycerides well controlled as well.  Continue heart healthy diet, exercise. Change Lipitor 80 mg QD to Crestor 40 mg QD.    Prior echocardiogram 9/3/2021 with normal left ventricular systolic function, and ejection fraction estimated at 65%.  There are no regional wall motion abnormalities or significant valvular disease.  Blood pressure very well controlled, off antihypertensives, with today's reading 120/80 and heart rate 60 bpm.  Weight is stable at 186 pounds, with normal BMI at 25.8.  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  Patient remains at low cardiovascular risk.           Follow-up in 1 year with Dr. Hunt.           Interval History:      This is a very pleasant 58-year-old male with a history of dyslipidemia and family history of premature CAD with father having myocardial infarction at age 51.  He is a very health conscious .  To review his case, on 05/20/2018 several hours into a triathlon, while running, he experienced a cardiac arrest which was  witnessed.  Fortunately, an AED was applied and he received 3 shocks and subsequent CPR.  He presented to [facility] at which time ECG revealed anterior ST elevations.   Emergent cardiac catheterization revealed 100% occlusion of proximal LAD and he thereafter underwent PCI/KASSANDRA with 3.5 x 23 mm stent to the proximal LAD.  He had 10% mid RCA disease and reported 80% mid circumflex disease. He underwent therapeutic hypothermia with subsequent mild cognitive decline and transient memory loss, which has subsequently resolved.   Echocardiogram 05/23/2018 revealed slight improvement in left ventricular ejection fraction at 50%.       Metoprolol and lisinopril were stopped afterward due to lightheadedness.He was maintained on aspirin, Brilinta, high-dose atorvastatin, lisinopril 10 mg daily, metoprolol tartrate 25 mg twice daily.      He underwent repeat coronary angiography to address the left circumflex lesion on 06/07/2018.  However this was only noted to be 20% stenotic with a widely patent LAD stent, and only luminal irregularities of the mid LAD.  RCA was not re-imaged.      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  iFR and FFR were both not hemodynamically significant, measuring 1.0 and 0.85, respectively.  His distal RCA had 20% stenosis.  Ejection fraction by left ventriculography was 65%.     He performed excellent on his exercise nuclear stress test on 08/22/2018 and subsequently on his treadmill exercise stress test 08/06/2019.        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 This revealed 1 mm ST-segment depressions at peak exercise and very early recovery which quickly resolved.  He had a very good functional capacity of 18 meds, and no evidence of  dysrhythmia or chest pain.  Subsequent nuclear stress test 08/25/2020 was within normal limits.     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 was within normal limits. He performed excellent on his treadmill exercise stress test on 09/25/2023.     Most recent lipid panel 11/5/24 demonstrated LDL cholesterol 87 mg/dL with triglycerides 139 mg/dL, HDL 45 mg/dL.     He presents today for follow-up with no complaints.  He continues to exercise vigorously and feels very well without exertional symptoms. Patient denies chest pain, chest pressure, chest discomfort or burning, shortness of breath, dyspnea on exertion, palpitations, skipped heartbeats, lightheadedness, dizziness, presyncope or syncope.      Patient has been taking his medications without access issues, missed doses or side effects.    I personally reviewed this patient's past medical history, surgical history, family history, social history and outpatient medications and allergies.    Objective:  Vitals:    11/05/24 0802   BP: 120/80   Pulse: 60     Weight today in the office: 186#    Physical Exam  Vitals and nursing note reviewed.   Constitutional:       General: He is not in acute distress.  HENT:      Head: Normocephalic and atraumatic.      Nose: No congestion or rhinorrhea.      Mouth/Throat:      Mouth: Mucous membranes are moist.   Eyes:      Conjunctiva/sclera: Conjunctivae normal.   Neck:      Vascular: No carotid bruit.   Cardiovascular:      Rate and Rhythm: Normal rate and regular rhythm. No extrasystoles are present.     Chest Wall: No thrill.      Heart sounds: S2 normal. No murmur heard.  Pulmonary:      Breath sounds: No wheezing, rhonchi or rales.   Abdominal:      General: Abdomen is flat.   Musculoskeletal:         General: No swelling.   Skin:     General: Skin is warm and dry.   Neurological:      Mental Status: He is alert and oriented to person, place, and time.   Psychiatric:         Behavior: Behavior normal.      Data:  I reviewed the lipid panel results from 11/5/24.    Exercise stress test: 10/1/24 normal test.  ECG normal at rest.  Excellent exercise capacity.  20 METs achieved.  No ST deviation is noted.  No arrhythmias.  EKG is negative for ischemia without anginal symptoms.    Loni Jasmine PA-C  [facility]

## 2024-11-07 RX ORDER — ROSUVASTATIN CALCIUM 40 MG/1
40 TABLET, COATED ORAL DAILY
Qty: 90 TABLET | Refills: 3 | Status: SHIPPED | OUTPATIENT
Start: 2024-11-07

## 2024-11-11 ENCOUNTER — OFFICE VISIT (OUTPATIENT)
Age: 59
End: 2024-11-11

## 2024-11-11 VITALS
HEIGHT: 70 IN | SYSTOLIC BLOOD PRESSURE: 116 MMHG | HEART RATE: 68 BPM | WEIGHT: 184 LBS | DIASTOLIC BLOOD PRESSURE: 74 MMHG | BODY MASS INDEX: 26.34 KG/M2

## 2024-11-11 DIAGNOSIS — Z02.89 ENCOUNTER FOR FEDERAL AVIATION ADMINISTRATION (FAA) EXAMINATION: Primary | ICD-10-CM

## 2024-11-11 PROCEDURE — 99499 UNLISTED E&M SERVICE: CPT | Performed by: FAMILY MEDICINE

## 2024-11-11 NOTE — PROGRESS NOTES
Chief Complaint   Patient presents with    Physical Exam     Faa 1st class, correctives, meds, no ekg

## 2024-12-11 DIAGNOSIS — E78.00 HYPERCHOLESTEROLEMIA: Primary | ICD-10-CM

## 2024-12-11 RX ORDER — ATORVASTATIN CALCIUM 80 MG/1
80 TABLET, FILM COATED ORAL DAILY
Qty: 30 TABLET | Refills: 4 | Status: SHIPPED | OUTPATIENT
Start: 2024-12-11

## 2024-12-11 NOTE — PROGRESS NOTES
Switched from Lipitor for Crestor for better LDL control.    Patient reports myalgia, arthralgia and muscle cramps on Crestor despite 1 month of compliance.     Patient asking to go back to Lipitor 80 mg QD with more focus on fiber intake, low cholesterol diet and weight loss. This is fine.    Plan to re-check Lipid panel with PCP in 3-6 months.    If LDL still not at goal (well below 70, non-HDL cholesterol < 100), can add Zetia 10 mg QD.

## 2025-01-05 DIAGNOSIS — E78.00 HYPERCHOLESTEROLEMIA: ICD-10-CM

## 2025-01-07 RX ORDER — ATORVASTATIN CALCIUM 80 MG/1
80 TABLET, FILM COATED ORAL DAILY
Qty: 90 TABLET | Refills: 1 | Status: SHIPPED | OUTPATIENT
Start: 2025-01-07

## 2025-01-24 ENCOUNTER — DOCUMENTATION (OUTPATIENT)
Dept: CARDIOLOGY CLINIC | Facility: CLINIC | Age: 60
End: 2025-01-24

## 2025-01-30 DIAGNOSIS — E78.00 HYPERCHOLESTEROLEMIA: ICD-10-CM

## 2025-01-30 RX ORDER — ATORVASTATIN CALCIUM 80 MG/1
80 TABLET, FILM COATED ORAL DAILY
Qty: 90 TABLET | Refills: 0 | Status: CANCELLED | OUTPATIENT
Start: 2025-01-30

## 2025-03-03 DIAGNOSIS — I25.10 ATHEROSCLEROSIS OF NATIVE CORONARY ARTERY OF NATIVE HEART WITHOUT ANGINA PECTORIS: Primary | ICD-10-CM

## 2025-03-04 ENCOUNTER — APPOINTMENT (OUTPATIENT)
Dept: LAB | Facility: CLINIC | Age: 60
End: 2025-03-04
Payer: COMMERCIAL

## 2025-03-04 DIAGNOSIS — I25.10 ATHEROSCLEROSIS OF NATIVE CORONARY ARTERY OF NATIVE HEART WITHOUT ANGINA PECTORIS: ICD-10-CM

## 2025-03-04 LAB
EST. AVERAGE GLUCOSE BLD GHB EST-MCNC: 117 MG/DL
HBA1C MFR BLD: 5.7 %

## 2025-03-04 PROCEDURE — 83036 HEMOGLOBIN GLYCOSYLATED A1C: CPT

## 2025-03-04 PROCEDURE — 36415 COLL VENOUS BLD VENIPUNCTURE: CPT

## 2025-05-19 ENCOUNTER — OFFICE VISIT (OUTPATIENT)
Age: 60
End: 2025-05-19

## 2025-05-19 VITALS
BODY MASS INDEX: 25.91 KG/M2 | DIASTOLIC BLOOD PRESSURE: 80 MMHG | WEIGHT: 181 LBS | HEIGHT: 70 IN | SYSTOLIC BLOOD PRESSURE: 128 MMHG

## 2025-05-19 DIAGNOSIS — Z02.89 ENCOUNTER FOR FEDERAL AVIATION ADMINISTRATION (FAA) EXAMINATION: Primary | ICD-10-CM

## 2025-05-19 PROCEDURE — 99499FA: Performed by: FAMILY MEDICINE

## 2025-05-19 PROCEDURE — 93000 ELECTROCARDIOGRAM COMPLETE: CPT | Performed by: FAMILY MEDICINE

## 2025-05-19 NOTE — PROGRESS NOTES
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

## 2025-05-22 ENCOUNTER — TELEPHONE (OUTPATIENT)
Age: 60
End: 2025-05-22

## 2025-05-22 DIAGNOSIS — E78.00 HYPERCHOLESTEROLEMIA: ICD-10-CM

## 2025-05-22 DIAGNOSIS — E55.9 VITAMIN D DEFICIENCY: Primary | ICD-10-CM

## 2025-05-22 DIAGNOSIS — Z13.1 DIABETES MELLITUS SCREENING: ICD-10-CM

## 2025-05-22 NOTE — TELEPHONE ENCOUNTER
Patient is scheduled for his annual physical on 8/1. He is requesting that lab orders be placed prior to his appointment for completion.    Please advise and follow up as necessary.

## 2025-06-28 NOTE — TELEPHONE ENCOUNTER
Phone call from patient, [phone], states he had stress test done today  He is questioning if dr Eliana Gupta wants to schedule ov to see patient, or will he send paper work to [facility] as he has done in the past    Please advise  Sandra Escobar [address] [address] [phone]    Referring Provider  Diego Henry DO    Procedure Provider  Diego Henry 

## 2025-07-09 DIAGNOSIS — E78.00 HYPERCHOLESTEROLEMIA: ICD-10-CM

## 2025-07-09 NOTE — TELEPHONE ENCOUNTER
Fax refill request from [facility]    90 day supply  Atorvastatin 80 mg    Last office visit 11/5/24 Loni Jasmine  Pending Recall Nov 2025  Dr. Hunt

## 2025-07-10 RX ORDER — ATORVASTATIN CALCIUM 80 MG/1
80 TABLET, FILM COATED ORAL DAILY
Qty: 90 TABLET | Refills: 1 | Status: SHIPPED | OUTPATIENT
Start: 2025-07-10

## 2025-07-14 ENCOUNTER — TELEPHONE (OUTPATIENT)
Dept: UROLOGY | Facility: MEDICAL CENTER | Age: 60
End: 2025-07-14

## 2025-07-14 NOTE — TELEPHONE ENCOUNTER
Spoke to patient and reminded him of appointment on 7/25 and asked him to have his PSA blood work done prior. He said he would.

## 2025-07-17 ENCOUNTER — APPOINTMENT (OUTPATIENT)
Dept: LAB | Facility: CLINIC | Age: 60
End: 2025-07-17
Payer: COMMERCIAL

## 2025-07-17 DIAGNOSIS — R97.20 INCREASED PROSTATE SPECIFIC ANTIGEN (PSA) VELOCITY: ICD-10-CM

## 2025-07-17 DIAGNOSIS — E55.9 VITAMIN D DEFICIENCY: ICD-10-CM

## 2025-07-17 DIAGNOSIS — E78.00 HYPERCHOLESTEROLEMIA: ICD-10-CM

## 2025-07-17 DIAGNOSIS — Z13.1 DIABETES MELLITUS SCREENING: ICD-10-CM

## 2025-07-17 LAB
25(OH)D3 SERPL-MCNC: 40.3 NG/ML (ref 30–100)
ALBUMIN SERPL BCG-MCNC: 4 G/DL (ref 3.5–5)
ALP SERPL-CCNC: 66 U/L (ref 34–104)
ALT SERPL W P-5'-P-CCNC: 60 U/L (ref 7–52)
ANION GAP SERPL CALCULATED.3IONS-SCNC: 8 MMOL/L (ref 4–13)
AST SERPL W P-5'-P-CCNC: 48 U/L (ref 13–39)
BASOPHILS # BLD AUTO: 0.03 THOUSANDS/ÂΜL (ref 0–0.1)
BASOPHILS NFR BLD AUTO: 1 % (ref 0–1)
BILIRUB SERPL-MCNC: 0.66 MG/DL (ref 0.2–1)
BUN SERPL-MCNC: 13 MG/DL (ref 5–25)
CALCIUM SERPL-MCNC: 9 MG/DL (ref 8.4–10.2)
CHLORIDE SERPL-SCNC: 104 MMOL/L (ref 96–108)
CHOLEST SERPL-MCNC: 130 MG/DL (ref ?–200)
CO2 SERPL-SCNC: 27 MMOL/L (ref 21–32)
CREAT SERPL-MCNC: 0.83 MG/DL (ref 0.6–1.3)
EOSINOPHIL # BLD AUTO: 0.08 THOUSAND/ÂΜL (ref 0–0.61)
EOSINOPHIL NFR BLD AUTO: 2 % (ref 0–6)
ERYTHROCYTE [DISTWIDTH] IN BLOOD BY AUTOMATED COUNT: 12.1 % (ref 11.6–15.1)
EST. AVERAGE GLUCOSE BLD GHB EST-MCNC: 114 MG/DL
GFR SERPL CREATININE-BSD FRML MDRD: 96 ML/MIN/1.73SQ M
GLUCOSE P FAST SERPL-MCNC: 101 MG/DL (ref 65–99)
HBA1C MFR BLD: 5.6 %
HCT VFR BLD AUTO: 45.5 % (ref 36.5–49.3)
HDLC SERPL-MCNC: 50 MG/DL
HGB BLD-MCNC: 15.6 G/DL (ref 12–17)
IMM GRANULOCYTES # BLD AUTO: 0.01 THOUSAND/UL (ref 0–0.2)
IMM GRANULOCYTES NFR BLD AUTO: 0 % (ref 0–2)
LDLC SERPL CALC-MCNC: 61 MG/DL (ref 0–100)
LYMPHOCYTES # BLD AUTO: 0.98 THOUSANDS/ÂΜL (ref 0.6–4.47)
LYMPHOCYTES NFR BLD AUTO: 22 % (ref 14–44)
MCH RBC QN AUTO: 32.7 PG (ref 26.8–34.3)
MCHC RBC AUTO-ENTMCNC: 34.3 G/DL (ref 31.4–37.4)
MCV RBC AUTO: 95 FL (ref 82–98)
MONOCYTES # BLD AUTO: 0.55 THOUSAND/ÂΜL (ref 0.17–1.22)
MONOCYTES NFR BLD AUTO: 12 % (ref 4–12)
NEUTROPHILS # BLD AUTO: 2.89 THOUSANDS/ÂΜL (ref 1.85–7.62)
NEUTS SEG NFR BLD AUTO: 63 % (ref 43–75)
NRBC BLD AUTO-RTO: 0 /100 WBCS
PLATELET # BLD AUTO: 231 THOUSANDS/UL (ref 149–390)
PMV BLD AUTO: 10.2 FL (ref 8.9–12.7)
POTASSIUM SERPL-SCNC: 4.2 MMOL/L (ref 3.5–5.3)
PROT SERPL-MCNC: 6.7 G/DL (ref 6.4–8.4)
PSA SERPL-MCNC: 3.84 NG/ML (ref 0–4)
RBC # BLD AUTO: 4.77 MILLION/UL (ref 3.88–5.62)
SODIUM SERPL-SCNC: 139 MMOL/L (ref 135–147)
TRIGL SERPL-MCNC: 95 MG/DL (ref ?–150)
TSH SERPL DL<=0.05 MIU/L-ACNC: 3.47 UIU/ML (ref 0.45–4.5)
WBC # BLD AUTO: 4.54 THOUSAND/UL (ref 4.31–10.16)

## 2025-07-17 PROCEDURE — 36415 COLL VENOUS BLD VENIPUNCTURE: CPT

## 2025-07-17 PROCEDURE — 83036 HEMOGLOBIN GLYCOSYLATED A1C: CPT

## 2025-07-17 PROCEDURE — G0103 PSA SCREENING: HCPCS

## 2025-07-17 PROCEDURE — 80053 COMPREHEN METABOLIC PANEL: CPT

## 2025-07-17 PROCEDURE — 82306 VITAMIN D 25 HYDROXY: CPT

## 2025-07-17 PROCEDURE — 84443 ASSAY THYROID STIM HORMONE: CPT

## 2025-07-17 PROCEDURE — 80061 LIPID PANEL: CPT

## 2025-07-17 PROCEDURE — 85025 COMPLETE CBC W/AUTO DIFF WBC: CPT

## 2025-07-25 ENCOUNTER — OFFICE VISIT (OUTPATIENT)
Dept: UROLOGY | Facility: MEDICAL CENTER | Age: 60
End: 2025-07-25
Payer: COMMERCIAL

## 2025-07-25 VITALS
BODY MASS INDEX: 25.48 KG/M2 | OXYGEN SATURATION: 96 % | WEIGHT: 178 LBS | HEART RATE: 68 BPM | SYSTOLIC BLOOD PRESSURE: 102 MMHG | HEIGHT: 70 IN | DIASTOLIC BLOOD PRESSURE: 70 MMHG

## 2025-07-25 DIAGNOSIS — R97.20 INCREASED PROSTATE SPECIFIC ANTIGEN (PSA) VELOCITY: ICD-10-CM

## 2025-07-25 DIAGNOSIS — Z12.5 SCREENING FOR PROSTATE CANCER: Primary | ICD-10-CM

## 2025-07-25 PROCEDURE — 99213 OFFICE O/P EST LOW 20 MIN: CPT

## 2025-07-25 PROCEDURE — 81003 URINALYSIS AUTO W/O SCOPE: CPT

## 2025-07-25 RX ORDER — FLUOROURACIL 50 MG/G
CREAM TOPICAL
COMMUNITY
Start: 2025-07-17

## 2025-07-25 NOTE — ASSESSMENT & PLAN NOTE
Patient denies a known family history for prostate cancer  Patient's most recent PSA was performed 7/17/2025 and found to be 3.844.  Refer to PSA trend below.  JAYCOB performed today.  Palpate benign prostate.  Reassured the patient that his JAYCOB in the office today was unremarkable.  Reviewed his most recent PSA and his PSA trend.  We discussed that his PSA has increased in velocity once more and that we should plan to repeat the PSA at a shorter interval to closely monitor the PSA.  It was discussed that if his PSA were to increase further and returned elevated that we should consider obtaining a multiparametric MRI of the prostate.  Patient will repeat a PSA in 3 months and our office will call for results.  If patient's PSA remained stable we can plan to repeat PSA in 1 year.  If patient's PSA returns elevated then we will plan to obtain a multiparametric MRI of the prostate.  If the patient were to be greatest PI-RADS category 4 or 5 then my recommendation would be to proceed with an MRI fusion transperineal prostate biopsy.  Our office will call for results.    Lab Results   Component Value Date    PSA 3.844 07/17/2025    PSA 3.079 06/13/2024    PSA 3.10 05/03/2024

## 2025-07-25 NOTE — PROGRESS NOTES
7/25/2025      Assessment and Plan    60 y.o. male managed by Dr. Brandon    Increased prostate specific antigen (PSA) velocity  Patient denies a known family history for prostate cancer  Patient's most recent PSA was performed 7/17/2025 and found to be 3.844.  Refer to PSA trend below.  JAYCOB performed today.  Palpate benign prostate.  Reassured the patient that his JAYCOB in the office today was unremarkable.  Reviewed his most recent PSA and his PSA trend.  We discussed that his PSA has increased in velocity once more and that we should plan to repeat the PSA at a shorter interval to closely monitor the PSA.  It was discussed that if his PSA were to increase further and returned elevated that we should consider obtaining a multiparametric MRI of the prostate.  Patient will repeat a PSA in 3 months and our office will call for results.  If patient's PSA remained stable we can plan to repeat PSA in 1 year.  If patient's PSA returns elevated then we will plan to obtain a multiparametric MRI of the prostate.  If the patient were to be greatest PI-RADS category 4 or 5 then my recommendation would be to proceed with an MRI fusion transperineal prostate biopsy.  Our office will call for results.    Lab Results   Component Value Date    PSA 3.844 07/17/2025    PSA 3.079 06/13/2024    PSA 3.10 05/03/2024            History of Present Illness  Jaspreet Hayes is a 60 y.o. male here for evaluation of prostate cancer screening.  Patient was last seen in the office on 7/19/2024.    Patient was referred back to our practice at that time given increased PSA velocity.  Patient's PSA has been slowly rising over the last several years.  Patient's most recent PSA was performed 7/17/2025 and found to be 3.844.    At our last office visit, patient did report nocturia x 1, but otherwise was content with his voiding pattern off of pharmacotherapy.    Today, the patient offers no new lower urinary tract complaints.  Patient notes that he was  "cycling quite often as well as performed an Ironman competition prior to obtaining his PSA testing.  Patient notes that he does continue to experience nocturia x 1 and rarely sleeps through the night.  Otherwise, patient is content with his voiding pattern at this time and offers no other complaints.    Review of Systems   Constitutional:  Negative for chills and fever.   HENT:  Negative for ear pain and sore throat.    Eyes:  Negative for pain and visual disturbance.   Respiratory:  Negative for cough and shortness of breath.    Cardiovascular:  Negative for chest pain and palpitations.   Gastrointestinal:  Negative for abdominal pain and vomiting.   Genitourinary:  Negative for decreased urine volume, difficulty urinating, dysuria, flank pain, frequency, hematuria and urgency.   Musculoskeletal:  Negative for arthralgias and back pain.   Skin:  Negative for color change and rash.   Neurological:  Negative for seizures and syncope.   All other systems reviewed and are negative.               Vitals  Vitals:    07/25/25 1056   BP: 102/70   BP Location: Left arm   Patient Position: Sitting   Cuff Size: Adult   Pulse: 68   SpO2: 96%   Weight: 80.7 kg (178 lb)   Height: 5' 10\" (1.778 m)       Physical Exam  Vitals reviewed.   Constitutional:       General: He is not in acute distress.     Appearance: Normal appearance. He is not ill-appearing.   HENT:      Head: Normocephalic and atraumatic.      Nose: Nose normal.     Eyes:      General: No scleral icterus.    Pulmonary:      Effort: No respiratory distress.   Abdominal:      General: Abdomen is flat. There is no distension.      Palpations: Abdomen is soft.      Tenderness: There is no abdominal tenderness.     Musculoskeletal:         General: Normal range of motion.      Cervical back: Normal range of motion.     Skin:     General: Skin is warm.      Coloration: Skin is not jaundiced.     Neurological:      Mental Status: He is alert and oriented to person, place, " and time.      Gait: Gait normal.     Psychiatric:         Mood and Affect: Mood normal.         Behavior: Behavior normal.           Past History  Past Medical History[1]  Social History[2]  Tobacco Use History[3]  Family History[4]    The following portions of the patient's history were reviewed and updated as appropriate: allergies, current medications, past medical history, past social history, past surgical history and problem list.    Results  No results found for this or any previous visit (from the past hour).]  Lab Results   Component Value Date    PSA 3.844 07/17/2025    PSA 3.079 06/13/2024    PSA 3.10 05/03/2024    PSA 2.3 10/12/2022     Lab Results   Component Value Date    CALCIUM 9.0 07/17/2025    K 4.2 07/17/2025    CO2 27 07/17/2025     07/17/2025    BUN 13 07/17/2025    CREATININE 0.83 07/17/2025     Lab Results   Component Value Date    WBC 4.54 07/17/2025    HGB 15.6 07/17/2025    HCT 45.5 07/17/2025    MCV 95 07/17/2025     07/17/2025             [1]   Past Medical History:  Diagnosis Date    Coronary artery disease 05/18/2018    Hyperlipidemia     Myocardial infarction (HCC)     cardiac stent   [2]   Social History  Socioeconomic History    Marital status: /Civil Union   Occupational History    Occupation:      Employer: [employer]     Comment: Full-time   Tobacco Use    Smoking status: Never     Passive exposure: Past    Smokeless tobacco: Never    Tobacco comments:     No tobacco/smoke exposure   Vaping Use    Vaping status: Never Used   Substance and Sexual Activity    Alcohol use: Yes     Comment: Social    Drug use: Never    Sexual activity: Yes     Partners: Female     Birth control/protection: Male Sterilization   Social History Narrative    Exercises regularly   [3]   Social History  Tobacco Use   Smoking Status Never    Passive exposure: Past   Smokeless Tobacco Never   Tobacco Comments    No tobacco/smoke exposure   [4]   Family History  Problem Relation  Name Age of Onset    Cancer Mother Mom         lung cancer    Hypertension Mother Mom     Breast cancer Mother Mom     Coronary artery disease Father Joshua Hayes     Hyperlipidemia Father Joshua Hayes     Hypertension Father Joshua Hayes     Hypertension Sister Tracee Horne     Hypertension Brother      Hypertension Brother      No Known Problems Son      No Known Problems Son      No Known Problems Daughter      Colon cancer Neg Hx

## 2025-08-01 ENCOUNTER — OFFICE VISIT (OUTPATIENT)
Dept: FAMILY MEDICINE CLINIC | Facility: CLINIC | Age: 60
End: 2025-08-01
Payer: COMMERCIAL

## 2025-08-01 VITALS
BODY MASS INDEX: 26.51 KG/M2 | HEIGHT: 69 IN | OXYGEN SATURATION: 98 % | WEIGHT: 179 LBS | SYSTOLIC BLOOD PRESSURE: 124 MMHG | HEART RATE: 53 BPM | DIASTOLIC BLOOD PRESSURE: 84 MMHG

## 2025-08-01 DIAGNOSIS — I25.10 ATHEROSCLEROSIS OF NATIVE CORONARY ARTERY OF NATIVE HEART WITHOUT ANGINA PECTORIS: ICD-10-CM

## 2025-08-01 DIAGNOSIS — Z00.00 ANNUAL PHYSICAL EXAM: Primary | ICD-10-CM

## 2025-08-01 DIAGNOSIS — I25.84 CORONARY ATHEROSCLEROSIS DUE TO CALCIFIED CORONARY LESION: ICD-10-CM

## 2025-08-01 DIAGNOSIS — E55.9 VITAMIN D DEFICIENCY: ICD-10-CM

## 2025-08-01 DIAGNOSIS — I25.10 CORONARY ATHEROSCLEROSIS DUE TO CALCIFIED CORONARY LESION: ICD-10-CM

## 2025-08-01 DIAGNOSIS — B35.1 ONYCHOMYCOSIS OF TOENAIL: ICD-10-CM

## 2025-08-01 DIAGNOSIS — H93.13 TINNITUS OF BOTH EARS: ICD-10-CM

## 2025-08-01 DIAGNOSIS — Z13.1 DIABETES MELLITUS SCREENING: ICD-10-CM

## 2025-08-01 DIAGNOSIS — E78.00 HYPERCHOLESTEROLEMIA: ICD-10-CM

## 2025-08-01 DIAGNOSIS — R79.89 ELEVATED LFTS: ICD-10-CM

## 2025-08-01 PROCEDURE — 99396 PREV VISIT EST AGE 40-64: CPT | Performed by: PHYSICIAN ASSISTANT

## 2025-08-03 PROBLEM — B35.1 ONYCHOMYCOSIS OF TOENAIL: Status: ACTIVE | Noted: 2025-08-03

## 2025-08-03 PROBLEM — H93.13 TINNITUS OF BOTH EARS: Status: ACTIVE | Noted: 2025-08-03

## 2025-08-05 ENCOUNTER — OFFICE VISIT (OUTPATIENT)
Dept: URGENT CARE | Facility: MEDICAL CENTER | Age: 60
End: 2025-08-05
Payer: COMMERCIAL

## 2025-08-05 VITALS
DIASTOLIC BLOOD PRESSURE: 75 MMHG | RESPIRATION RATE: 18 BRPM | SYSTOLIC BLOOD PRESSURE: 114 MMHG | OXYGEN SATURATION: 97 % | HEIGHT: 70 IN | BODY MASS INDEX: 25.54 KG/M2 | HEART RATE: 52 BPM | TEMPERATURE: 97.1 F | WEIGHT: 178.4 LBS

## 2025-08-05 DIAGNOSIS — H10.32 ACUTE BACTERIAL CONJUNCTIVITIS OF LEFT EYE: Primary | ICD-10-CM

## 2025-08-05 PROCEDURE — G0382 LEV 3 HOSP TYPE B ED VISIT: HCPCS | Performed by: NURSE PRACTITIONER

## 2025-08-05 RX ORDER — TOBRAMYCIN 3 MG/ML
1 SOLUTION/ DROPS OPHTHALMIC
Qty: 5 ML | Refills: 0 | Status: SHIPPED | OUTPATIENT
Start: 2025-08-05